# Patient Record
Sex: FEMALE | Race: WHITE | NOT HISPANIC OR LATINO | Employment: OTHER | URBAN - METROPOLITAN AREA
[De-identification: names, ages, dates, MRNs, and addresses within clinical notes are randomized per-mention and may not be internally consistent; named-entity substitution may affect disease eponyms.]

---

## 2023-09-21 ENCOUNTER — HOSPITAL ENCOUNTER (OUTPATIENT)
Dept: DATA CONVERSION | Facility: HOSPITAL | Age: 68
Discharge: HOME | End: 2023-09-21
Payer: MEDICARE

## 2023-09-21 DIAGNOSIS — M17.12 UNILATERAL PRIMARY OSTEOARTHRITIS, LEFT KNEE: ICD-10-CM

## 2023-09-21 LAB
ANION GAP SERPL CALCULATED.3IONS-SCNC: 13 MMOL/L (ref 0–19)
BACTERIA UR QL AUTO: NEGATIVE
BASOPHILS # BLD AUTO: 0.08 K/UL (ref 0–0.22)
BASOPHILS NFR BLD AUTO: 1.1 % (ref 0–1)
BILIRUB UR QL STRIP.AUTO: NEGATIVE
BUN SERPL-MCNC: 20 MG/DL (ref 8–25)
BUN/CREAT SERPL: 20 RATIO (ref 8–21)
CALCIUM SERPL-MCNC: 10.3 MG/DL (ref 8.5–10.4)
CHLORIDE SERPL-SCNC: 102 MMOL/L (ref 97–107)
CLARITY UR: CLEAR
CO2 SERPL-SCNC: 28 MMOL/L (ref 24–31)
COLOR UR: NORMAL
CREAT SERPL-MCNC: 1 MG/DL (ref 0.4–1.6)
DEPRECATED RDW RBC AUTO: 49.1 FL (ref 37–54)
DIFFERENTIAL METHOD BLD: ABNORMAL
EOSINOPHIL # BLD AUTO: 0.14 K/UL (ref 0–0.45)
EOSINOPHIL NFR BLD: 1.9 % (ref 0–3)
ERYTHROCYTE [DISTWIDTH] IN BLOOD BY AUTOMATED COUNT: 14.6 % (ref 11.7–15)
GFR SERPL CREATININE-BSD FRML MDRD: 61 ML/MIN/1.73 M2
GLUCOSE SERPL-MCNC: 93 MG/DL (ref 65–99)
GLUCOSE UR STRIP.AUTO-MCNC: NEGATIVE MG/DL
HBA1C MFR BLD: 6 % (ref 4–6)
HCT VFR BLD AUTO: 44.3 % (ref 36–44)
HGB BLD-MCNC: 14.5 GM/DL (ref 12–15)
HGB UR QL STRIP.AUTO: 1 /HPF (ref 0–3)
HGB UR QL: NEGATIVE
HYALINE CASTS UR QL AUTO: NORMAL /LPF
IMM GRANULOCYTES # BLD AUTO: 0.02 K/UL (ref 0–0.1)
KETONES UR QL STRIP.AUTO: NEGATIVE
LEUKOCYTE ESTERASE UR QL STRIP.AUTO: NEGATIVE
LYMPHOCYTES # BLD AUTO: 1.53 K/UL (ref 1.2–3.2)
LYMPHOCYTES NFR BLD MANUAL: 20.7 % (ref 20–40)
MCH RBC QN AUTO: 30 PG (ref 26–34)
MCHC RBC AUTO-ENTMCNC: 32.7 % (ref 31–37)
MCV RBC AUTO: 91.5 FL (ref 80–100)
MICROSCOPIC (UA): NORMAL
MONOCYTES # BLD AUTO: 0.92 K/UL (ref 0–0.8)
MONOCYTES NFR BLD MANUAL: 12.5 % (ref 0–8)
MRSA DNA SPEC QL NAA+PROBE: NEGATIVE
NEUTROPHILS # BLD AUTO: 4.69 K/UL
NEUTROPHILS # BLD AUTO: 4.69 K/UL (ref 1.8–7.7)
NEUTROPHILS.IMMATURE NFR BLD: 0.3 % (ref 0–1)
NEUTS SEG NFR BLD: 63.5 % (ref 50–70)
NITRITE UR QL STRIP.AUTO: NEGATIVE
NRBC BLD-RTO: 0 /100 WBC
PH UR STRIP.AUTO: 7 [PH] (ref 4.6–8)
PLATELET # BLD AUTO: 270 K/UL (ref 150–450)
PMV BLD AUTO: 12 CU (ref 7–12.6)
POTASSIUM SERPL-SCNC: 3.7 MMOL/L (ref 3.4–5.1)
PROT UR STRIP.AUTO-MCNC: NEGATIVE MG/DL
RBC # BLD AUTO: 4.84 M/UL (ref 4–4.9)
SODIUM SERPL-SCNC: 143 MMOL/L (ref 133–145)
SP GR UR STRIP.AUTO: 1.03 (ref 1–1.03)
SPECIMEN SOURCE: NORMAL
SQUAMOUS UR QL AUTO: NORMAL /HPF
URINE CULTURE: NORMAL
UROBILINOGEN UR QL STRIP.AUTO: NORMAL MG/DL (ref 0–1)
WBC # BLD AUTO: 7.4 K/UL (ref 4.5–11)
WBC #/AREA URNS AUTO: 2 /HPF (ref 0–3)

## 2023-10-03 PROBLEM — M17.12 ARTHRITIS OF LEFT KNEE: Status: ACTIVE | Noted: 2023-10-03

## 2023-10-04 ENCOUNTER — HOSPITAL ENCOUNTER (OUTPATIENT)
Facility: HOSPITAL | Age: 68
Discharge: HOME HEALTH | End: 2023-10-05
Attending: ORTHOPAEDIC SURGERY | Admitting: ORTHOPAEDIC SURGERY
Payer: MEDICARE

## 2023-10-04 ENCOUNTER — PHARMACY VISIT (OUTPATIENT)
Dept: PHARMACY | Facility: CLINIC | Age: 68
End: 2023-10-04
Payer: COMMERCIAL

## 2023-10-04 ENCOUNTER — ANESTHESIA (OUTPATIENT)
Dept: OPERATING ROOM | Facility: HOSPITAL | Age: 68
End: 2023-10-04
Payer: MEDICARE

## 2023-10-04 ENCOUNTER — ANESTHESIA EVENT (OUTPATIENT)
Dept: OPERATING ROOM | Facility: HOSPITAL | Age: 68
End: 2023-10-04
Payer: MEDICARE

## 2023-10-04 DIAGNOSIS — M17.12 ARTHRITIS OF LEFT KNEE: Primary | ICD-10-CM

## 2023-10-04 PROCEDURE — 3600000017 HC OR TIME - EACH INCREMENTAL 1 MINUTE - PROCEDURE LEVEL SIX: Performed by: ORTHOPAEDIC SURGERY

## 2023-10-04 PROCEDURE — 2780000003 HC OR 278 NO HCPCS: Performed by: ORTHOPAEDIC SURGERY

## 2023-10-04 PROCEDURE — 2580000001 HC RX 258 IV SOLUTIONS: Performed by: ORTHOPAEDIC SURGERY

## 2023-10-04 PROCEDURE — 2720000007 HC OR 272 NO HCPCS: Performed by: ORTHOPAEDIC SURGERY

## 2023-10-04 PROCEDURE — C1776 JOINT DEVICE (IMPLANTABLE): HCPCS | Performed by: ORTHOPAEDIC SURGERY

## 2023-10-04 PROCEDURE — 3700000002 HC GENERAL ANESTHESIA TIME - EACH INCREMENTAL 1 MINUTE: Performed by: ORTHOPAEDIC SURGERY

## 2023-10-04 PROCEDURE — 2500000004 HC RX 250 GENERAL PHARMACY W/ HCPCS (ALT 636 FOR OP/ED): Performed by: ANESTHESIOLOGY

## 2023-10-04 PROCEDURE — 7100000002 HC RECOVERY ROOM TIME - EACH INCREMENTAL 1 MINUTE: Performed by: ORTHOPAEDIC SURGERY

## 2023-10-04 PROCEDURE — 7100000011 HC EXTENDED STAY RECOVERY HOURLY - NURSING UNIT

## 2023-10-04 PROCEDURE — 7100000001 HC RECOVERY ROOM TIME - INITIAL BASE CHARGE: Performed by: ORTHOPAEDIC SURGERY

## 2023-10-04 PROCEDURE — 2500000004 HC RX 250 GENERAL PHARMACY W/ HCPCS (ALT 636 FOR OP/ED): Performed by: ORTHOPAEDIC SURGERY

## 2023-10-04 PROCEDURE — A27447 PR TOTAL KNEE ARTHROPLASTY: Performed by: ANESTHESIOLOGY

## 2023-10-04 PROCEDURE — 2500000005 HC RX 250 GENERAL PHARMACY W/O HCPCS: Performed by: ORTHOPAEDIC SURGERY

## 2023-10-04 PROCEDURE — 94762 N-INVAS EAR/PLS OXIMTRY CONT: CPT

## 2023-10-04 PROCEDURE — 3600000018 HC OR TIME - INITIAL BASE CHARGE - PROCEDURE LEVEL SIX: Performed by: ORTHOPAEDIC SURGERY

## 2023-10-04 PROCEDURE — 2500000005 HC RX 250 GENERAL PHARMACY W/O HCPCS: Performed by: ANESTHESIOLOGIST ASSISTANT

## 2023-10-04 PROCEDURE — 3700000001 HC GENERAL ANESTHESIA TIME - INITIAL BASE CHARGE: Performed by: ORTHOPAEDIC SURGERY

## 2023-10-04 PROCEDURE — A27447 PR TOTAL KNEE ARTHROPLASTY: Performed by: ANESTHESIOLOGIST ASSISTANT

## 2023-10-04 PROCEDURE — 97161 PT EVAL LOW COMPLEX 20 MIN: CPT | Mod: GP

## 2023-10-04 PROCEDURE — RXMED WILLOW AMBULATORY MEDICATION CHARGE

## 2023-10-04 PROCEDURE — 97165 OT EVAL LOW COMPLEX 30 MIN: CPT | Mod: GO

## 2023-10-04 PROCEDURE — 2500000001 HC RX 250 WO HCPCS SELF ADMINISTERED DRUGS (ALT 637 FOR MEDICARE OP): Performed by: ORTHOPAEDIC SURGERY

## 2023-10-04 PROCEDURE — 2500000004 HC RX 250 GENERAL PHARMACY W/ HCPCS (ALT 636 FOR OP/ED): Performed by: ANESTHESIOLOGIST ASSISTANT

## 2023-10-04 DEVICE — ATTUNE KNEE SYSTEM TIBIAL BASE ROTATING PLATFORM SIZE 4 CEMENTED
Type: IMPLANTABLE DEVICE | Site: KNEE | Status: FUNCTIONAL
Brand: ATTUNE

## 2023-10-04 DEVICE — ATTUNE KNEE SYSTEM FEMORAL POSTERIOR STABILIZED SIZE 4 LEFT CEMENTED
Type: IMPLANTABLE DEVICE | Site: KNEE | Status: FUNCTIONAL
Brand: ATTUNE

## 2023-10-04 DEVICE — ATTUNE PATELLA MEDIALIZED DOME 38MM CEMENTED AOX
Type: IMPLANTABLE DEVICE | Site: KNEE | Status: FUNCTIONAL
Brand: ATTUNE

## 2023-10-04 DEVICE — SMARTSET HV HIGH VISCOSITY BONE CEMENT 40G
Type: IMPLANTABLE DEVICE | Site: KNEE | Status: FUNCTIONAL
Brand: SMARTSET

## 2023-10-04 RX ORDER — DIAZEPAM 5 MG/1
5 TABLET ORAL EVERY 6 HOURS PRN
Status: DISCONTINUED | OUTPATIENT
Start: 2023-10-04 | End: 2023-10-05 | Stop reason: HOSPADM

## 2023-10-04 RX ORDER — IBUPROFEN 800 MG/1
800 TABLET ORAL EVERY 6 HOURS PRN
COMMUNITY
End: 2023-10-05 | Stop reason: HOSPADM

## 2023-10-04 RX ORDER — CEPHALEXIN 500 MG/1
500 CAPSULE ORAL 3 TIMES DAILY
Qty: 21 CAPSULE | Refills: 0 | Status: SHIPPED | OUTPATIENT
Start: 2023-10-04 | End: 2023-10-12

## 2023-10-04 RX ORDER — DEXAMETHASONE SODIUM PHOSPHATE 100 MG/10ML
INJECTION INTRAMUSCULAR; INTRAVENOUS AS NEEDED
Status: DISCONTINUED | OUTPATIENT
Start: 2023-10-04 | End: 2023-10-04

## 2023-10-04 RX ORDER — ACETAMINOPHEN 325 MG/1
650 TABLET ORAL EVERY 6 HOURS SCHEDULED
Status: DISCONTINUED | OUTPATIENT
Start: 2023-10-04 | End: 2023-10-05 | Stop reason: HOSPADM

## 2023-10-04 RX ORDER — NALOXONE HYDROCHLORIDE 0.4 MG/ML
0.2 INJECTION, SOLUTION INTRAMUSCULAR; INTRAVENOUS; SUBCUTANEOUS EVERY 5 MIN PRN
Status: DISCONTINUED | OUTPATIENT
Start: 2023-10-04 | End: 2023-10-04

## 2023-10-04 RX ORDER — CEFAZOLIN SODIUM 2 G/100ML
INJECTION, SOLUTION INTRAVENOUS AS NEEDED
Status: DISCONTINUED | OUTPATIENT
Start: 2023-10-04 | End: 2023-10-04

## 2023-10-04 RX ORDER — ONDANSETRON HYDROCHLORIDE 2 MG/ML
4 INJECTION, SOLUTION INTRAVENOUS EVERY 4 HOURS PRN
Status: DISCONTINUED | OUTPATIENT
Start: 2023-10-04 | End: 2023-10-05 | Stop reason: HOSPADM

## 2023-10-04 RX ORDER — DIPHENHYDRAMINE HCL 25 MG
12.5 TABLET ORAL EVERY 6 HOURS PRN
Status: DISCONTINUED | OUTPATIENT
Start: 2023-10-04 | End: 2023-10-05 | Stop reason: HOSPADM

## 2023-10-04 RX ORDER — MORPHINE SULFATE 2 MG/ML
INJECTION, SOLUTION INTRAMUSCULAR; INTRAVENOUS AS NEEDED
Status: DISCONTINUED | OUTPATIENT
Start: 2023-10-04 | End: 2023-10-04 | Stop reason: HOSPADM

## 2023-10-04 RX ORDER — ASPIRIN 81 MG/1
81 TABLET ORAL ONCE
COMMUNITY
Start: 2022-09-28 | End: 2023-10-05 | Stop reason: HOSPADM

## 2023-10-04 RX ORDER — SODIUM CHLORIDE 9 MG/ML
100 INJECTION, SOLUTION INTRAVENOUS CONTINUOUS
Status: DISCONTINUED | OUTPATIENT
Start: 2023-10-04 | End: 2023-10-04

## 2023-10-04 RX ORDER — ONDANSETRON HYDROCHLORIDE 2 MG/ML
INJECTION, SOLUTION INTRAVENOUS AS NEEDED
Status: DISCONTINUED | OUTPATIENT
Start: 2023-10-04 | End: 2023-10-04

## 2023-10-04 RX ORDER — ASPIRIN 81 MG/1
81 TABLET ORAL 2 TIMES DAILY
Status: SHIPPED | OUTPATIENT
Start: 2023-10-04

## 2023-10-04 RX ORDER — BUPIVACAINE HCL/EPINEPHRINE 0.5-1:200K
VIAL (ML) INJECTION AS NEEDED
Status: DISCONTINUED | OUTPATIENT
Start: 2023-10-04 | End: 2023-10-04 | Stop reason: HOSPADM

## 2023-10-04 RX ORDER — LABETALOL HYDROCHLORIDE 5 MG/ML
INJECTION, SOLUTION INTRAVENOUS AS NEEDED
Status: DISCONTINUED | OUTPATIENT
Start: 2023-10-04 | End: 2023-10-04

## 2023-10-04 RX ORDER — CEFAZOLIN SODIUM 2 G/100ML
2 INJECTION, SOLUTION INTRAVENOUS EVERY 8 HOURS
Status: DISCONTINUED | OUTPATIENT
Start: 2023-10-04 | End: 2023-10-05 | Stop reason: HOSPADM

## 2023-10-04 RX ORDER — OMEPRAZOLE 40 MG/1
40 CAPSULE, DELAYED RELEASE ORAL
COMMUNITY
Start: 2022-09-16

## 2023-10-04 RX ORDER — ASPIRIN 81 MG/1
81 TABLET ORAL 2 TIMES DAILY
Status: DISCONTINUED | OUTPATIENT
Start: 2023-10-05 | End: 2023-10-05 | Stop reason: HOSPADM

## 2023-10-04 RX ORDER — FLUTICASONE PROPIONATE 50 MCG
2 SPRAY, SUSPENSION (ML) NASAL DAILY
COMMUNITY
Start: 2022-09-04

## 2023-10-04 RX ORDER — LIDOCAINE HYDROCHLORIDE 10 MG/ML
0.1 INJECTION, SOLUTION EPIDURAL; INFILTRATION; INTRACAUDAL; PERINEURAL ONCE
Status: DISCONTINUED | OUTPATIENT
Start: 2023-10-04 | End: 2023-10-04 | Stop reason: HOSPADM

## 2023-10-04 RX ORDER — SODIUM CHLORIDE, SODIUM LACTATE, POTASSIUM CHLORIDE, CALCIUM CHLORIDE 600; 310; 30; 20 MG/100ML; MG/100ML; MG/100ML; MG/100ML
50 INJECTION, SOLUTION INTRAVENOUS CONTINUOUS
Status: DISCONTINUED | OUTPATIENT
Start: 2023-10-04 | End: 2023-10-04

## 2023-10-04 RX ORDER — KETOROLAC TROMETHAMINE 30 MG/ML
15 INJECTION, SOLUTION INTRAMUSCULAR; INTRAVENOUS EVERY 6 HOURS
Status: DISCONTINUED | OUTPATIENT
Start: 2023-10-04 | End: 2023-10-05 | Stop reason: HOSPADM

## 2023-10-04 RX ORDER — FENTANYL CITRATE 50 UG/ML
50 INJECTION, SOLUTION INTRAMUSCULAR; INTRAVENOUS EVERY 5 MIN PRN
Status: DISCONTINUED | OUTPATIENT
Start: 2023-10-04 | End: 2023-10-04 | Stop reason: HOSPADM

## 2023-10-04 RX ORDER — CEFAZOLIN SODIUM 2 G/100ML
2 INJECTION, SOLUTION INTRAVENOUS ONCE
Status: DISCONTINUED | OUTPATIENT
Start: 2023-10-04 | End: 2023-10-04

## 2023-10-04 RX ORDER — MELOXICAM 15 MG/1
15 TABLET ORAL
COMMUNITY
End: 2023-10-05 | Stop reason: HOSPADM

## 2023-10-04 RX ORDER — MIDAZOLAM HYDROCHLORIDE 1 MG/ML
2 INJECTION INTRAMUSCULAR; INTRAVENOUS ONCE
Status: COMPLETED | OUTPATIENT
Start: 2023-10-04 | End: 2023-10-04

## 2023-10-04 RX ORDER — TRIAMTERENE/HYDROCHLOROTHIAZID 37.5-25 MG
1 TABLET ORAL 2 TIMES DAILY
COMMUNITY
Start: 2021-08-31

## 2023-10-04 RX ORDER — CEFAZOLIN SODIUM 2 G/100ML
2 INJECTION, SOLUTION INTRAVENOUS EVERY 8 HOURS
Status: DISCONTINUED | OUTPATIENT
Start: 2023-10-04 | End: 2023-10-04

## 2023-10-04 RX ORDER — ACETAMINOPHEN 500 MG
500 TABLET ORAL EVERY 6 HOURS PRN
COMMUNITY
Start: 2022-10-06 | End: 2023-10-05 | Stop reason: HOSPADM

## 2023-10-04 RX ORDER — NALOXONE HYDROCHLORIDE 0.4 MG/ML
0.2 INJECTION, SOLUTION INTRAMUSCULAR; INTRAVENOUS; SUBCUTANEOUS EVERY 5 MIN PRN
Status: DISCONTINUED | OUTPATIENT
Start: 2023-10-04 | End: 2023-10-05 | Stop reason: HOSPADM

## 2023-10-04 RX ORDER — ACETAMINOPHEN 10 MG/ML
1000 INJECTION, SOLUTION INTRAVENOUS ONCE
Status: COMPLETED | OUTPATIENT
Start: 2023-10-04 | End: 2023-10-04

## 2023-10-04 RX ORDER — PROPOFOL 10 MG/ML
INJECTION, EMULSION INTRAVENOUS AS NEEDED
Status: DISCONTINUED | OUTPATIENT
Start: 2023-10-04 | End: 2023-10-04

## 2023-10-04 RX ORDER — FERROUS SULFATE 325(65) MG
65 TABLET ORAL
Status: DISCONTINUED | OUTPATIENT
Start: 2023-10-04 | End: 2023-10-05 | Stop reason: HOSPADM

## 2023-10-04 RX ORDER — MONTELUKAST SODIUM 10 MG/1
1 TABLET ORAL NIGHTLY
COMMUNITY
Start: 2021-08-25

## 2023-10-04 RX ORDER — FENTANYL CITRATE 50 UG/ML
INJECTION, SOLUTION INTRAMUSCULAR; INTRAVENOUS AS NEEDED
Status: DISCONTINUED | OUTPATIENT
Start: 2023-10-04 | End: 2023-10-04

## 2023-10-04 RX ORDER — FLUTICASONE PROPIONATE 50 MCG
2 SPRAY, SUSPENSION (ML) NASAL EVERY 12 HOURS PRN
COMMUNITY
Start: 2023-09-21

## 2023-10-04 RX ORDER — BENZONATATE 100 MG/1
200 CAPSULE ORAL EVERY 8 HOURS PRN
COMMUNITY
Start: 2023-04-05

## 2023-10-04 RX ORDER — TRANEXAMIC ACID 100 MG/ML
INJECTION, SOLUTION INTRAVENOUS AS NEEDED
Status: DISCONTINUED | OUTPATIENT
Start: 2023-10-04 | End: 2023-10-04

## 2023-10-04 RX ORDER — LIDOCAINE HYDROCHLORIDE 10 MG/ML
INJECTION INFILTRATION; PERINEURAL AS NEEDED
Status: DISCONTINUED | OUTPATIENT
Start: 2023-10-04 | End: 2023-10-04

## 2023-10-04 RX ORDER — HYDROCODONE BITARTRATE AND ACETAMINOPHEN 5; 325 MG/1; MG/1
1 TABLET ORAL
Qty: 36 TABLET | Refills: 0 | Status: SHIPPED | OUTPATIENT
Start: 2023-10-04 | End: 2023-10-11

## 2023-10-04 RX ORDER — DOCUSATE SODIUM 100 MG/1
100 CAPSULE, LIQUID FILLED ORAL 2 TIMES DAILY
Status: DISCONTINUED | OUTPATIENT
Start: 2023-10-04 | End: 2023-10-05 | Stop reason: HOSPADM

## 2023-10-04 RX ORDER — KETOROLAC TROMETHAMINE 30 MG/ML
15 INJECTION, SOLUTION INTRAMUSCULAR; INTRAVENOUS EVERY 6 HOURS PRN
Status: DISCONTINUED | OUTPATIENT
Start: 2023-10-05 | End: 2023-10-05 | Stop reason: HOSPADM

## 2023-10-04 RX ORDER — ALBUTEROL SULFATE 90 UG/1
2 AEROSOL, METERED RESPIRATORY (INHALATION) EVERY 4 HOURS PRN
COMMUNITY
Start: 2023-04-05

## 2023-10-04 RX ORDER — VIT C/E/ZN/COPPR/LUTEIN/ZEAXAN 250MG-90MG
1000 CAPSULE ORAL
COMMUNITY
Start: 2012-01-09

## 2023-10-04 RX ORDER — SODIUM CHLORIDE AND POTASSIUM CHLORIDE 150; 900 MG/100ML; MG/100ML
100 INJECTION, SOLUTION INTRAVENOUS CONTINUOUS
Status: DISCONTINUED | OUTPATIENT
Start: 2023-10-04 | End: 2023-10-05 | Stop reason: HOSPADM

## 2023-10-04 RX ORDER — SODIUM CHLORIDE, SODIUM LACTATE, POTASSIUM CHLORIDE, CALCIUM CHLORIDE 600; 310; 30; 20 MG/100ML; MG/100ML; MG/100ML; MG/100ML
100 INJECTION, SOLUTION INTRAVENOUS CONTINUOUS
Status: DISCONTINUED | OUTPATIENT
Start: 2023-10-04 | End: 2023-10-04 | Stop reason: HOSPADM

## 2023-10-04 RX ORDER — HYDROCODONE BITARTRATE AND ACETAMINOPHEN 5; 325 MG/1; MG/1
2 TABLET ORAL EVERY 6 HOURS PRN
Status: DISCONTINUED | OUTPATIENT
Start: 2023-10-04 | End: 2023-10-05 | Stop reason: HOSPADM

## 2023-10-04 RX ORDER — CEFAZOLIN SODIUM 1 G/50ML
1 SOLUTION INTRAVENOUS ONCE
Status: DISCONTINUED | OUTPATIENT
Start: 2023-10-04 | End: 2023-10-04

## 2023-10-04 RX ORDER — HYDROCODONE BITARTRATE AND ACETAMINOPHEN 5; 325 MG/1; MG/1
1 TABLET ORAL EVERY 6 HOURS PRN
Status: DISCONTINUED | OUTPATIENT
Start: 2023-10-04 | End: 2023-10-05 | Stop reason: HOSPADM

## 2023-10-04 RX ORDER — VANCOMYCIN HYDROCHLORIDE 1 G/20ML
INJECTION, POWDER, LYOPHILIZED, FOR SOLUTION INTRAVENOUS AS NEEDED
Status: DISCONTINUED | OUTPATIENT
Start: 2023-10-04 | End: 2023-10-04 | Stop reason: HOSPADM

## 2023-10-04 RX ADMIN — MIDAZOLAM HYDROCHLORIDE 2 MG: 1 INJECTION, SOLUTION INTRAMUSCULAR; INTRAVENOUS at 10:15

## 2023-10-04 RX ADMIN — SODIUM CHLORIDE, POTASSIUM CHLORIDE, SODIUM LACTATE AND CALCIUM CHLORIDE: 600; 310; 30; 20 INJECTION, SOLUTION INTRAVENOUS at 11:23

## 2023-10-04 RX ADMIN — HYDROMORPHONE HYDROCHLORIDE 0.5 MG: 1 INJECTION, SOLUTION INTRAMUSCULAR; INTRAVENOUS; SUBCUTANEOUS at 12:41

## 2023-10-04 RX ADMIN — HYDROMORPHONE HYDROCHLORIDE 0.5 MG: 1 INJECTION, SOLUTION INTRAMUSCULAR; INTRAVENOUS; SUBCUTANEOUS at 13:19

## 2023-10-04 RX ADMIN — CEFAZOLIN SODIUM 2 G: 2 INJECTION, SOLUTION INTRAVENOUS at 10:48

## 2023-10-04 RX ADMIN — FENTANYL CITRATE 50 MCG: 0.05 INJECTION, SOLUTION INTRAMUSCULAR; INTRAVENOUS at 10:59

## 2023-10-04 RX ADMIN — FENTANYL CITRATE 50 MCG: 0.05 INJECTION, SOLUTION INTRAMUSCULAR; INTRAVENOUS at 11:10

## 2023-10-04 RX ADMIN — ONDANSETRON HYDROCHLORIDE 4 MG: 2 INJECTION INTRAMUSCULAR; INTRAVENOUS at 10:52

## 2023-10-04 RX ADMIN — CEFAZOLIN SODIUM 2 G: 2 INJECTION, SOLUTION INTRAVENOUS at 23:01

## 2023-10-04 RX ADMIN — FENTANYL CITRATE 50 MCG: 0.05 INJECTION, SOLUTION INTRAMUSCULAR; INTRAVENOUS at 10:52

## 2023-10-04 RX ADMIN — ACETAMINOPHEN 650 MG: 325 TABLET, FILM COATED ORAL at 21:07

## 2023-10-04 RX ADMIN — HYDROMORPHONE HYDROCHLORIDE 0.5 MG: 1 INJECTION, SOLUTION INTRAMUSCULAR; INTRAVENOUS; SUBCUTANEOUS at 12:30

## 2023-10-04 RX ADMIN — SODIUM CHLORIDE AND POTASSIUM CHLORIDE 100 ML/HR: 9; 1.49 INJECTION, SOLUTION INTRAVENOUS at 15:33

## 2023-10-04 RX ADMIN — FENTANYL CITRATE 50 MCG: 0.05 INJECTION, SOLUTION INTRAMUSCULAR; INTRAVENOUS at 10:46

## 2023-10-04 RX ADMIN — ONDANSETRON 4 MG: 2 INJECTION INTRAMUSCULAR; INTRAVENOUS at 18:18

## 2023-10-04 RX ADMIN — FERROUS SULFATE TAB 325 MG (65 MG ELEMENTAL FE) 65 MG OF IRON: 325 (65 FE) TAB at 17:11

## 2023-10-04 RX ADMIN — TRANEXAMIC ACID 2 G: 100 INJECTION, SOLUTION INTRAVENOUS at 10:52

## 2023-10-04 RX ADMIN — LABETALOL HYDROCHLORIDE 5 MG: 5 INJECTION, SOLUTION INTRAVENOUS at 11:01

## 2023-10-04 RX ADMIN — ACETAMINOPHEN 650 MG: 325 TABLET, FILM COATED ORAL at 17:11

## 2023-10-04 RX ADMIN — DOCUSATE SODIUM 100 MG: 100 CAPSULE, LIQUID FILLED ORAL at 21:08

## 2023-10-04 RX ADMIN — DEXAMETHASONE SODIUM PHOSPHATE 8 MG: 10 INJECTION INTRAMUSCULAR; INTRAVENOUS at 10:52

## 2023-10-04 RX ADMIN — FENTANYL CITRATE 50 MCG: 50 INJECTION, SOLUTION INTRAMUSCULAR; INTRAVENOUS at 10:15

## 2023-10-04 RX ADMIN — LIDOCAINE HYDROCHLORIDE 50 MG: 10 INJECTION, SOLUTION INFILTRATION; PERINEURAL at 10:39

## 2023-10-04 RX ADMIN — SODIUM CHLORIDE, POTASSIUM CHLORIDE, SODIUM LACTATE AND CALCIUM CHLORIDE: 600; 310; 30; 20 INJECTION, SOLUTION INTRAVENOUS at 10:32

## 2023-10-04 RX ADMIN — ACETAMINOPHEN 1000 MG: 10 INJECTION INTRAVENOUS at 13:05

## 2023-10-04 RX ADMIN — FENTANYL CITRATE 25 MCG: 0.05 INJECTION, SOLUTION INTRAMUSCULAR; INTRAVENOUS at 12:03

## 2023-10-04 RX ADMIN — FENTANYL CITRATE 50 MCG: 0.05 INJECTION, SOLUTION INTRAMUSCULAR; INTRAVENOUS at 12:18

## 2023-10-04 RX ADMIN — PROPOFOL 200 MG: 10 INJECTION, EMULSION INTRAVENOUS at 10:39

## 2023-10-04 RX ADMIN — FENTANYL CITRATE 25 MCG: 0.05 INJECTION, SOLUTION INTRAMUSCULAR; INTRAVENOUS at 11:58

## 2023-10-04 RX ADMIN — HYDROMORPHONE HYDROCHLORIDE 0.5 MG: 1 INJECTION, SOLUTION INTRAMUSCULAR; INTRAVENOUS; SUBCUTANEOUS at 12:55

## 2023-10-04 RX ADMIN — Medication 2 L/MIN: at 14:15

## 2023-10-04 RX ADMIN — CEFAZOLIN SODIUM 2 G: 2 INJECTION, SOLUTION INTRAVENOUS at 15:31

## 2023-10-04 RX ADMIN — KETOROLAC TROMETHAMINE 15 MG: 30 INJECTION, SOLUTION INTRAMUSCULAR at 17:11

## 2023-10-04 RX ADMIN — KETOROLAC TROMETHAMINE 15 MG: 30 INJECTION, SOLUTION INTRAMUSCULAR at 23:00

## 2023-10-04 SDOH — ECONOMIC STABILITY: HOUSING INSECURITY
IN THE LAST 12 MONTHS, WAS THERE A TIME WHEN YOU DID NOT HAVE A STEADY PLACE TO SLEEP OR SLEPT IN A SHELTER (INCLUDING NOW)?: NO

## 2023-10-04 SDOH — ECONOMIC STABILITY: INCOME INSECURITY: IN THE PAST 12 MONTHS, HAS THE ELECTRIC, GAS, OIL, OR WATER COMPANY THREATENED TO SHUT OFF SERVICE IN YOUR HOME?: NO

## 2023-10-04 SDOH — ECONOMIC STABILITY: INCOME INSECURITY: HOW HARD IS IT FOR YOU TO PAY FOR THE VERY BASICS LIKE FOOD, HOUSING, MEDICAL CARE, AND HEATING?: NOT HARD AT ALL

## 2023-10-04 SDOH — ECONOMIC STABILITY: FOOD INSECURITY: WITHIN THE PAST 12 MONTHS, THE FOOD YOU BOUGHT JUST DIDN'T LAST AND YOU DIDN'T HAVE MONEY TO GET MORE.: NEVER TRUE

## 2023-10-04 SDOH — ECONOMIC STABILITY: FOOD INSECURITY: WITHIN THE PAST 12 MONTHS, YOU WORRIED THAT YOUR FOOD WOULD RUN OUT BEFORE YOU GOT MONEY TO BUY MORE.: NEVER TRUE

## 2023-10-04 SDOH — HEALTH STABILITY: PHYSICAL HEALTH: ON AVERAGE, HOW MANY MINUTES DO YOU ENGAGE IN EXERCISE AT THIS LEVEL?: 0 MIN

## 2023-10-04 SDOH — SOCIAL STABILITY: SOCIAL INSECURITY: HAS ANYONE EVER THREATENED TO HURT YOUR FAMILY OR YOUR PETS?: NO

## 2023-10-04 SDOH — SOCIAL STABILITY: SOCIAL INSECURITY
WITHIN THE LAST YEAR, HAVE TO BEEN RAPED OR FORCED TO HAVE ANY KIND OF SEXUAL ACTIVITY BY YOUR PARTNER OR EX-PARTNER?: NO

## 2023-10-04 SDOH — SOCIAL STABILITY: SOCIAL INSECURITY: ARE YOU OR HAVE YOU BEEN THREATENED OR ABUSED PHYSICALLY, EMOTIONALLY, OR SEXUALLY BY ANYONE?: NO

## 2023-10-04 SDOH — SOCIAL STABILITY: SOCIAL NETWORK: HOW OFTEN DO YOU ATTENT MEETINGS OF THE CLUB OR ORGANIZATION YOU BELONG TO?: MORE THAN 4 TIMES PER YEAR

## 2023-10-04 SDOH — HEALTH STABILITY: MENTAL HEALTH: HOW OFTEN DO YOU HAVE 6 OR MORE DRINKS ON ONE OCCASION?: NEVER

## 2023-10-04 SDOH — ECONOMIC STABILITY: INCOME INSECURITY: IN THE LAST 12 MONTHS, WAS THERE A TIME WHEN YOU WERE NOT ABLE TO PAY THE MORTGAGE OR RENT ON TIME?: NO

## 2023-10-04 SDOH — SOCIAL STABILITY: SOCIAL NETWORK
IN A TYPICAL WEEK, HOW MANY TIMES DO YOU TALK ON THE PHONE WITH FAMILY, FRIENDS, OR NEIGHBORS?: MORE THAN THREE TIMES A WEEK

## 2023-10-04 SDOH — ECONOMIC STABILITY: HOUSING INSECURITY: IN THE LAST 12 MONTHS, HOW MANY PLACES HAVE YOU LIVED?: 1

## 2023-10-04 SDOH — HEALTH STABILITY: MENTAL HEALTH: HOW OFTEN DO YOU HAVE A DRINK CONTAINING ALCOHOL?: NEVER

## 2023-10-04 SDOH — HEALTH STABILITY: MENTAL HEALTH
STRESS IS WHEN SOMEONE FEELS TENSE, NERVOUS, ANXIOUS, OR CAN'T SLEEP AT NIGHT BECAUSE THEIR MIND IS TROUBLED. HOW STRESSED ARE YOU?: NOT AT ALL

## 2023-10-04 SDOH — SOCIAL STABILITY: SOCIAL INSECURITY: ABUSE: ADULT

## 2023-10-04 SDOH — SOCIAL STABILITY: SOCIAL INSECURITY: DO YOU FEEL ANYONE HAS EXPLOITED OR TAKEN ADVANTAGE OF YOU FINANCIALLY OR OF YOUR PERSONAL PROPERTY?: NO

## 2023-10-04 SDOH — SOCIAL STABILITY: SOCIAL NETWORK: ARE YOU MARRIED, WIDOWED, DIVORCED, SEPARATED, NEVER MARRIED, OR LIVING WITH A PARTNER?: MARRIED

## 2023-10-04 SDOH — SOCIAL STABILITY: SOCIAL NETWORK
DO YOU BELONG TO ANY CLUBS OR ORGANIZATIONS SUCH AS CHURCH GROUPS UNIONS, FRATERNAL OR ATHLETIC GROUPS, OR SCHOOL GROUPS?: NO

## 2023-10-04 SDOH — SOCIAL STABILITY: SOCIAL INSECURITY: ARE THERE ANY APPARENT SIGNS OF INJURIES/BEHAVIORS THAT COULD BE RELATED TO ABUSE/NEGLECT?: NO

## 2023-10-04 SDOH — SOCIAL STABILITY: SOCIAL NETWORK: HOW OFTEN DO YOU ATTEND CHURCH OR RELIGIOUS SERVICES?: MORE THAN 4 TIMES PER YEAR

## 2023-10-04 SDOH — SOCIAL STABILITY: SOCIAL INSECURITY: WITHIN THE LAST YEAR, HAVE YOU BEEN AFRAID OF YOUR PARTNER OR EX-PARTNER?: NO

## 2023-10-04 SDOH — SOCIAL STABILITY: SOCIAL INSECURITY: DOES ANYONE TRY TO KEEP YOU FROM HAVING/CONTACTING OTHER FRIENDS OR DOING THINGS OUTSIDE YOUR HOME?: NO

## 2023-10-04 SDOH — ECONOMIC STABILITY: TRANSPORTATION INSECURITY
IN THE PAST 12 MONTHS, HAS LACK OF TRANSPORTATION KEPT YOU FROM MEETINGS, WORK, OR FROM GETTING THINGS NEEDED FOR DAILY LIVING?: NO

## 2023-10-04 SDOH — SOCIAL STABILITY: SOCIAL INSECURITY: HAVE YOU HAD THOUGHTS OF HARMING ANYONE ELSE?: NO

## 2023-10-04 SDOH — SOCIAL STABILITY: SOCIAL INSECURITY: HAVE YOU HAD THOUGHTS OF HARMING ANYONE ELSE?: YES

## 2023-10-04 SDOH — HEALTH STABILITY: MENTAL HEALTH: HOW MANY STANDARD DRINKS CONTAINING ALCOHOL DO YOU HAVE ON A TYPICAL DAY?: PATIENT DOES NOT DRINK

## 2023-10-04 SDOH — HEALTH STABILITY: PHYSICAL HEALTH: ON AVERAGE, HOW MANY DAYS PER WEEK DO YOU ENGAGE IN MODERATE TO STRENUOUS EXERCISE (LIKE A BRISK WALK)?: 0 DAYS

## 2023-10-04 SDOH — SOCIAL STABILITY: SOCIAL INSECURITY: DO YOU FEEL UNSAFE GOING BACK TO THE PLACE WHERE YOU ARE LIVING?: NO

## 2023-10-04 SDOH — SOCIAL STABILITY: SOCIAL NETWORK: HOW OFTEN DO YOU GET TOGETHER WITH FRIENDS OR RELATIVES?: MORE THAN THREE TIMES A WEEK

## 2023-10-04 SDOH — ECONOMIC STABILITY: TRANSPORTATION INSECURITY
IN THE PAST 12 MONTHS, HAS THE LACK OF TRANSPORTATION KEPT YOU FROM MEDICAL APPOINTMENTS OR FROM GETTING MEDICATIONS?: NO

## 2023-10-04 ASSESSMENT — PAIN SCALES - GENERAL
PAINLEVEL_OUTOF10: 3
PAINLEVEL_OUTOF10: 7
PAINLEVEL_OUTOF10: 7
PAINLEVEL_OUTOF10: 6
PAINLEVEL_OUTOF10: 5 - MODERATE PAIN
PAINLEVEL_OUTOF10: 8
PAINLEVEL_OUTOF10: 5 - MODERATE PAIN
PAINLEVEL_OUTOF10: 8
PAINLEVEL_OUTOF10: 9
PAINLEVEL_OUTOF10: 8
PAINLEVEL_OUTOF10: 5 - MODERATE PAIN
PAINLEVEL_OUTOF10: 7
PAINLEVEL_OUTOF10: 4
PAINLEVEL_OUTOF10: 8
PAINLEVEL_OUTOF10: 8
PAINLEVEL_OUTOF10: 7
PAINLEVEL_OUTOF10: 4
PAINLEVEL_OUTOF10: 9

## 2023-10-04 ASSESSMENT — COGNITIVE AND FUNCTIONAL STATUS - GENERAL
TURNING FROM BACK TO SIDE WHILE IN FLAT BAD: A LITTLE
MOBILITY SCORE: 21
WALKING IN HOSPITAL ROOM: A LITTLE
STANDING UP FROM CHAIR USING ARMS: A LITTLE
WALKING IN HOSPITAL ROOM: A LOT
DRESSING REGULAR LOWER BODY CLOTHING: A LOT
MOVING TO AND FROM BED TO CHAIR: A LITTLE
DRESSING REGULAR LOWER BODY CLOTHING: A LITTLE
MOBILITY SCORE: 15
DAILY ACTIVITIY SCORE: 22
DAILY ACTIVITIY SCORE: 16
DAILY ACTIVITIY SCORE: 16
TOILETING: A LITTLE
MOBILITY SCORE: 15
STANDING UP FROM CHAIR USING ARMS: A LITTLE
CLIMB 3 TO 5 STEPS WITH RAILING: TOTAL
CLIMB 3 TO 5 STEPS WITH RAILING: A LOT
TOILETING: A LOT
DRESSING REGULAR LOWER BODY CLOTHING: A LOT
PATIENT BASELINE BEDBOUND: NO
HELP NEEDED FOR BATHING: A LOT
CLIMB 3 TO 5 STEPS WITH RAILING: TOTAL
DRESSING REGULAR UPPER BODY CLOTHING: A LOT
MOVING FROM LYING ON BACK TO SITTING ON SIDE OF FLAT BED WITH BEDRAILS: A LITTLE
MOVING FROM LYING ON BACK TO SITTING ON SIDE OF FLAT BED WITH BEDRAILS: A LITTLE
WALKING IN HOSPITAL ROOM: A LOT
DRESSING REGULAR UPPER BODY CLOTHING: A LITTLE
HELP NEEDED FOR BATHING: A LOT
TURNING FROM BACK TO SIDE WHILE IN FLAT BAD: A LITTLE
MOVING TO AND FROM BED TO CHAIR: A LITTLE
TOILETING: TOTAL

## 2023-10-04 ASSESSMENT — LIFESTYLE VARIABLES
AUDIT-C TOTAL SCORE: 0
PRESCIPTION_ABUSE_PAST_12_MONTHS: NO
HOW MANY STANDARD DRINKS CONTAINING ALCOHOL DO YOU HAVE ON A TYPICAL DAY: PATIENT DOES NOT DRINK
PRESCIPTION_ABUSE_PAST_12_MONTHS: NO
SKIP TO QUESTIONS 9-10: 1
AUDIT-C TOTAL SCORE: 0
AUDIT-C TOTAL SCORE: 0
HOW OFTEN DO YOU HAVE A DRINK CONTAINING ALCOHOL: NEVER
HOW MANY STANDARD DRINKS CONTAINING ALCOHOL DO YOU HAVE ON A TYPICAL DAY: PATIENT DOES NOT DRINK
HOW OFTEN DO YOU HAVE 6 OR MORE DRINKS ON ONE OCCASION: NEVER
SUBSTANCE_ABUSE_PAST_12_MONTHS: NO
SKIP TO QUESTIONS 9-10: 1
SKIP TO QUESTIONS 9-10: 1
AUDIT-C TOTAL SCORE: 0
HOW OFTEN DO YOU HAVE 6 OR MORE DRINKS ON ONE OCCASION: NEVER
HOW OFTEN DO YOU HAVE A DRINK CONTAINING ALCOHOL: NEVER
SUBSTANCE_ABUSE_PAST_12_MONTHS: NO
AUDIT-C TOTAL SCORE: 0

## 2023-10-04 ASSESSMENT — COLUMBIA-SUICIDE SEVERITY RATING SCALE - C-SSRS
1. IN THE PAST MONTH, HAVE YOU WISHED YOU WERE DEAD OR WISHED YOU COULD GO TO SLEEP AND NOT WAKE UP?: NO
6. HAVE YOU EVER DONE ANYTHING, STARTED TO DO ANYTHING, OR PREPARED TO DO ANYTHING TO END YOUR LIFE?: NO
2. HAVE YOU ACTUALLY HAD ANY THOUGHTS OF KILLING YOURSELF?: NO

## 2023-10-04 ASSESSMENT — ACTIVITIES OF DAILY LIVING (ADL)
DRESSING YOURSELF: INDEPENDENT
HEARING - LEFT EAR: FUNCTIONAL
WALKS IN HOME: NEEDS ASSISTANCE
ADEQUATE_TO_COMPLETE_ADL: YES
PATIENT'S MEMORY ADEQUATE TO SAFELY COMPLETE DAILY ACTIVITIES?: YES
DRESSING YOURSELF: NEEDS ASSISTANCE
TOILETING: NEEDS ASSISTANCE
FEEDING YOURSELF: INDEPENDENT
ADEQUATE_TO_COMPLETE_ADL: YES
ADL_ASSISTANCE: INDEPENDENT
HEARING - LEFT EAR: FUNCTIONAL
WALKS IN HOME: INDEPENDENT
TOILETING: NEEDS ASSISTANCE
ASSISTIVE_DEVICE: WALKER
PATIENT'S MEMORY ADEQUATE TO SAFELY COMPLETE DAILY ACTIVITIES?: YES
ADL_ASSISTANCE: INDEPENDENT
GROOMING: NEEDS ASSISTANCE
HEARING - RIGHT EAR: FUNCTIONAL
BATHING: NEEDS ASSISTANCE
FEEDING YOURSELF: INDEPENDENT
JUDGMENT_ADEQUATE_SAFELY_COMPLETE_DAILY_ACTIVITIES: YES
GROOMING: NEEDS ASSISTANCE
HEARING - RIGHT EAR: FUNCTIONAL
BATHING_ASSISTANCE: MAXIMAL
BATHING: INDEPENDENT
JUDGMENT_ADEQUATE_SAFELY_COMPLETE_DAILY_ACTIVITIES: YES

## 2023-10-04 ASSESSMENT — PATIENT HEALTH QUESTIONNAIRE - PHQ9
SUM OF ALL RESPONSES TO PHQ9 QUESTIONS 1 & 2: 0
1. LITTLE INTEREST OR PLEASURE IN DOING THINGS: NOT AT ALL
2. FEELING DOWN, DEPRESSED OR HOPELESS: NOT AT ALL
1. LITTLE INTEREST OR PLEASURE IN DOING THINGS: NOT AT ALL
2. FEELING DOWN, DEPRESSED OR HOPELESS: NOT AT ALL
SUM OF ALL RESPONSES TO PHQ9 QUESTIONS 1 & 2: 0

## 2023-10-04 NOTE — ANESTHESIA PREPROCEDURE EVALUATION
Patient: Gabby Turner    Procedure Information       Date/Time: 10/04/23 0930    Procedure: LEFT TOTAL KNEE  ARTHROPLASTY (DEPUY) (Left: Knee)    [facility]: [facility] / [facility]    Surgeons: Kiran Reyes MD            Relevant Problems   Other   (+) Arthritis of left knee       Clinical information reviewed:    Allergies  Meds     OB Status           NPO Detail:  NPO/Void Status  Date of Last Liquid: 10/03/23  Time of Last Liquid: 2000  Date of Last Solid: 10/03/23  Time of Last Solid: 2000         PHYSICAL EXAM    Anesthesia Plan    ASA 3

## 2023-10-04 NOTE — PROGRESS NOTES
Physical Therapy    Physical Therapy Evaluation    Patient Name: Gabby Turner  MRN: [id number]  Today's Date: 10/4/2023   Time Calculation  Start Time: 1451  Stop Time: 1520  Time Calculation (min): 29 min    Assessment/Plan   PT Assessment  PT Assessment Results: Decreased strength, Decreased range of motion, Decreased endurance, Impaired balance, Decreased mobility, Decreased safety awareness  Rehab Prognosis: Good  Medical Staff Made Aware: Yes  End of Session Communication: Bedside nurse  Assessment Comment: Pt is a 69 y/o female who is s/p L TKA. Limited by post-op pain, weakness and impaired mobility.  End of Session Patient Position: Bed, 3 rail up, Alarm on  IP OR SWING BED PT PLAN  Inpatient or Swing Bed: Inpatient  PT Plan  Treatment/Interventions: Bed mobility, Transfer training, Stair training, Gait training, Balance training, Strengthening, Endurance training, Range of motion, Therapeutic exercise, Therapeutic activity  PT Plan: Skilled PT  PT Frequency: BID  PT Discharge Recommendations: Low intensity level of continued care  Equipment Recommended upon Discharge: Wheeled walker  PT Recommended Transfer Status: Assist x1, Assistive device  PT - OK to Discharge: Yes      Subjective   General Visit Information:  General  Reason for Referral: s/p L TKA  Referred By: Dr. Reyes  Past Medical History Relevant to Rehab: knee OA  Family/Caregiver Present: Yes  Caregiver Feedback: spouse initially  Co-Treatment: OT  Co-Treatment Reason: safe mobility  Prior to Session Communication: Bedside nurse  Patient Position Received: Bed, 3 rail up  General Comment: Supine in bed, 3 L O2, PIV.  Home Living:  Home Living  Type of Home: House  Lives With: Spouse  Home Layout: Multi-level  Home Access: Stairs to enter with rails  Entrance Stairs-Number of Steps: 2  Bathroom Shower/Tub: Tub/shower unit, Walk-in shower  Bathroom Equipment: Built-in shower seat  Home Living Comments: split level home wtih 7 stairs to each  "floor  Prior Level of Function:  Prior Function Per Pt/Caregiver Report  Level of [place]: Independent with ADLs and functional transfers  ADL Assistance: Independent  Homemaking Assistance: Independent  Ambulatory Assistance: Independent  Prior Function Comments: Only used walker after R TKA  Precautions:  Precautions  Medical Precautions: Fall precautions  Post-Surgical Precautions: Left total knee precautions  Vital Signs:       Objective   Pain:  Pain Assessment  Pain Assessment: 0-10  Pain Score: 7  Pain Type: Surgical pain  Pain Location:  (L hip/\"Sciatic pain\")  Cognition:  Cognition  Overall Cognitive Status: Within Functional Limits  Attention: Within Functional Limits  Memory: Within Funtional Limits  Problem Solving: Within Functional Limits    General Assessments:      Static Sitting Balance  Static Sitting-Level of Assistance: Close supervision    Static Standing Balance  Static Standing-Level of Assistance: Moderate assistance  Functional Assessments:  Bed Mobility  Bed Mobility: Yes  Bed Mobility 1  Bed Mobility 1: Supine to sitting  Level of Assistance 1: Moderate assistance  Bed Mobility Comments 1: HOB elevated, increased time to complete  Bed Mobility 2  Bed Mobility  2: Sitting to supine  Level of Assistance 2: Moderate assistance (x2 due to poor positioning)  Bed Mobility 3  Bed Mobility 3: Rolling left, Rolling right  Level of Assistance 3: Minimum assistance  Bed Mobility Comments 3: used bedrails    Transfers  Transfer: Yes  Transfer 1  Technique 1: Sit to stand, Stand to sit  Transfer Device 1: Walker  Transfer Level of Assistance 1: Minimum assistance, +2  Trials/Comments 1: x 2 trials from elevated surface. Required assistance wtih scooting buttocks back onto bed due to elevated height compared to short stature.    Ambulation/Gait Training  Ambulation/Gait Training Performed: No  Extremity/Trunk Assessments:  RLE   RLE : Within Functional Limits  LLE   LLE : Exceptions to WFL  AROM " LLE (degrees)  LLE AROM Comment: about 60 degrees flex sitting on EOB  Strength LLE  LLE Overall Strength: Greater than or equal to 3/5 as evidenced by functional mobility (observed slight buckling with WBing)  Outcome Measures:  [facility] Basic Mobility  Turning from your back to your side while in a flat bed without using bedrails: A little  Moving from lying on your back to sitting on the side of a flat bed without using bedrails: A little  Moving to and from bed to chair (including a wheelchair): A little  Standing up from a chair using your arms (e.g. wheelchair or bedside chair): A little  To walk in hospital room: A lot  Climbing 3-5 steps with railing: Total  Basic Mobility - Total Score: 15    Encounter Problems       Encounter Problems (Active)       Balance       LTG - Patient will maintain balance (Progressing)       Start:  10/04/23    Expected End:  10/11/23               Mobility       LTG - Patient will navigate 4-6 steps with rails/device (Progressing)       Start:  10/04/23    Expected End:  10/11/23       Single handrail and supervision         STG - Patient will ambulate (Progressing)       Start:  10/04/23    Expected End:  10/11/23       > 150 ft with wheeled walker and mod indep         ROM (Progressing)       Start:  10/04/23    Expected End:  10/11/23       L knee flex 90 degtrees            Pain  Will manage pain during functional mobility.         Safety       LTG - Patient will demonstrate safety requirements appropriate to situation/environment (Progressing)       Start:  10/04/23    Expected End:  10/11/23               Transfers       STG - Patient will transfer sit to and from stand (Progressing)       Start:  10/04/23    Expected End:  10/11/23       With mod indep using wheeled walker.                Education Documentation  Precautions, taught by Morenita Monteiro, PT at 10/4/2023  3:38 PM.  Learner: Patient  Readiness: Acceptance  Method: Explanation  Response: Verbalizes  Understanding    Mobility Training, taught by Morenita Monteiro, PT at 10/4/2023  3:38 PM.  Learner: Patient  Readiness: Acceptance  Method: Explanation  Response: Verbalizes Understanding    Education Comments  No comments found.

## 2023-10-04 NOTE — OP NOTE
LEFT TOTAL KNEE  ARTHROPLASTY (DEPUY) (L) Operative Note     Date: 10/4/2023  OR Location: [facility]    Name: Gabby Turner, : 1955, Age: 68 y.o., MRN: [id number], Sex: female    Diagnosis  Pre-op Diagnosis     * Osteoarthritis of left knee, unspecified osteoarthritis type [M17.12] Post-op Diagnosis     * Osteoarthritis of left knee, unspecified osteoarthritis type [M17.12]     Procedures    * LEFT TOTAL KNEE  ARTHROPLASTY (DEPUY)    Surgeons      * Kiran Reyes - Primary    Resident/Fellow/Other Assistant:  Surgical Assistant: Makenzie Romo SA    Procedure Summary  Anesthesia: General  ASA: ASA status not filed in the log.  Anesthesia Staff: Anesthesiologist: Ronnie Reina MD  C-AA: FATOUMATA Whitlock  Estimated Blood Loss: 100 mL  Intra-op Medications:   Medication Name Total Dose   vancomycin (Vancocin) vial for injection 2 g   BUPivacaine-EPINEPHrine (Marcaine w/EPI) 0.5 %-1:200,000 injection 20 mL   morphine injection 2 mg   lactated Ringer's infusion Cannot be calculated   fentaNYL (Sublimaze) injection 50 mcg 50 mcg   midazolam (Versed) injection 2 mg 2 mg              Anesthesia Record               Intraprocedure I/O Totals          Intake    lactated Ringer's infusion 1000.00 mL    Total Intake 1000 mL          Specimen: No specimens collected     Staff:   Circulator: Alfred Richardson RN  Scrub Person: Donna Oneal         Drains and/or Catheters: * None in log *    Tourniquet Times:         Implants:  Implants              Findings: Severe osteoarthritis    Indications: Gabby Turner is an 68 y.o. female who is having surgery for * No pre-op diagnosis entered *.  None    The patient was seen in the preoperative area. The risks, benefits, complications, treatment options, non-operative alternatives, expected recovery and outcomes were discussed with the patient. The possibilities of reaction to medication, pulmonary aspiration, injury to surrounding structures, bleeding,  recurrent infection, the need for additional procedures, failure to diagnose a condition, and creating a complication requiring transfusion or operation were discussed with the patient. The patient concurred with the proposed plan, giving informed consent.  The site of surgery was properly noted/marked if necessary per policy. The patient has been actively warmed in preoperative area. Preoperative antibiotics have been ordered and given within 1 hours of incision. Venous thrombosis prophylaxis are not indicated.  After anesthesia the leg was prepped and draped in sterile fashion midline incision followed by trivector arthrotomy was performed patella was pushed aside meniscal remnants osteophytes removed intramedullary drill followed by the 1. Guide under suction was placed into the femur at 5 degree valgus 10 millimeter cut cut was made the femur sized best to a 4 cutting blocks were placed cuts were made followed by the box cut and the femur fit quite well attention was then turned to the tibia intramedullary drill followed by the 1. Guide under suction was placed depth of cut was estimated rotation was obtained with a guide aleja cut was made the tibia sized to a 14we then trialed this with a 12 poly and showed excellent stability and range of motion the sella sized to a 38 was reamed and drilled for direction patellar tracking all trials removed all 3 bones copious lavage dried cement placed components impacted once the cement was hard polyethylene was placed and again initial excellent stability range of motion patellar tracking was then closed deep with 1. Ethibond subcutaneous with 2 Vicryl skin with staples sterile dressing was placed patient returned to PACU in stable condition no complications assistant was required for manipulation of instrumentation and fixation of cutting blocks throughout the case  Procedure Details: See above  Complications:  None; patient tolerated the procedure well.    Disposition:  PACU - hemodynamically stable.  Condition: stable         Additional Details: None    Attending Attestation: I was present and scrubbed for the entire procedure.    Kiran Reyes  Phone Number: [phone]

## 2023-10-04 NOTE — CARE PLAN
The patient's goals for the shift include pain managed and comfort    The clinical goals for the shift include pain managed    Over the shift, the patient did not make progress toward the following goals. Barriers to progression include   Problem: Pain  Goal: Takes deep breaths with improved pain control throughout the shift  Outcome: Progressing  Goal: Turns in bed with improved pain control throughout the shift  Outcome: Progressing  Goal: Walks with improved pain control throughout the shift  Outcome: Progressing  Goal: Performs ADL's with improved pain control throughout shift  Outcome: Progressing  Goal: Participates in PT with improved pain control throughout the shift  Outcome: Progressing  Goal: Free from opioid side effects throughout the shift  Outcome: Progressing  Goal: Free from acute confusion related to pain meds throughout the shift  Outcome: Progressing     Problem: Fall/Injury  Goal: Not fall by end of shift  Outcome: Progressing  Goal: Be free from injury by end of the shift  Outcome: Progressing  Goal: Verbalize understanding of personal risk factors for fall in the hospital  Outcome: Progressing  Goal: Verbalize understanding of risk factor reduction measures to prevent injury from fall in the home  Outcome: Progressing  Goal: Use assistive devices by end of the shift  Outcome: Progressing  Goal: Pace activities to prevent fatigue by end of the shift  Outcome: Progressing   . Recommendations to address these barriers include        .

## 2023-10-04 NOTE — CARE PLAN
The patient's goals for the shift include pain managed and comfort    The clinical goals for the shift include sciatica pain controlled

## 2023-10-04 NOTE — CARE PLAN
Problem: OT Goals  Goal: ADLs  Description: Patient will complete ADLs with Mod I, using AE as needed, in order to increase patient's safety and independence with self-care tasks.  Outcome: Progressing  Goal: Functional Transfers  Description: Patient will complete functional txfers with Mod I, in order to increase patient's safety and independence with ADL tasks.  Outcome: Progressing  Goal: Precautions  Description: Patient will be able to recall knee precautions 100% of the time for patient's safety.  Outcome: Progressing  Goal: IADLs  Description: Patient will complete item retrieval and functional mobility tasks with Mod I, using AE as needed, in order to increase safety and independence with daily functional tasks.  Outcome: Progressing

## 2023-10-04 NOTE — ANESTHESIA PROCEDURE NOTES
Airway  Date/Time: 10/4/2023 10:51 AM  Urgency: elective      Staffing  Performed: EMELY   Authorized by: Ronnie Reina MD    Performed by: FATOUMATA Whitlock  Patient location during procedure: OR    Indications and Patient Condition  Indications for airway management: anesthesia  Spontaneous ventilation: present  Sedation level: deep  Preoxygenated: yes  Mask difficulty assessment: 0 - not attempted    Final Airway Details  Final airway type: supraglottic airway      Successful airway: classic  Size 4     Number of attempts at approach: 1          
Peripheral Block    Patient location during procedure: pre-op  Start time: 10/4/2023 10:10 AM  End time: 10/4/2023 10:25 AM  Reason for block: post-op pain management  Staffing  Performed: attending   Authorized by: Ronnie Reina MD    Performed by: Ronnie Reina MD  Preanesthetic Checklist  Completed: patient identified, IV checked, site marked, risks and benefits discussed, surgical consent, monitors and equipment checked, pre-op evaluation and timeout performed   Timeout performed at:   Peripheral Block  Patient position: laying flat  Prep: ChloraPrep  Patient monitoring: heart rate, cardiac monitor and continuous pulse ox  Block type: adductor canal  Injection technique: single-shot  Needle  Needle gauge: 22 G  Needle length: 10 cm  Needle localization: ultrasound guidance  Assessment  Injection assessment: negative aspiration for heme, no paresthesia on injection, incremental injection and local visualized surrounding nerve on ultrasound  Paresthesia pain: none  Heart rate change: no  Slow fractionated injection: no  Additional Notes  Attempt x1. Needle seen adjacent to femoral artery. Above local seen surrounding artery. See photo. No complications.          
Initial (On Arrival)

## 2023-10-04 NOTE — CARE PLAN
Problem: Balance  Goal: LTG - Patient will maintain balance  Outcome: Progressing     Problem: Mobility  Goal: LTG - Patient will navigate 4-6 steps with rails/device  Description: Single handrail and supervision  Outcome: Progressing  Goal: STG - Patient will ambulate  Description: > 150 ft with wheeled walker and mod indep  Outcome: Progressing  Goal: ROM  Description: L knee flex 90 degtrees  Outcome: Progressing     Problem: Safety  Goal: LTG - Patient will demonstrate safety requirements appropriate to situation/environment  Outcome: Progressing     Problem: Transfers  Goal: STG - Patient will transfer sit to and from stand  Description: With mod indep using wheeled walker.  Outcome: Progressing

## 2023-10-04 NOTE — NURSING NOTE
Patient arrived in her room, alert and orientedx4, on 2 liters of oxygen, pt spouse at bedside, call light within reach.

## 2023-10-04 NOTE — PROGRESS NOTES
Occupational Therapy    Evaluation    Patient Name: Gabby Turner  MRN: [id number]  Today's Date: 10/4/2023  Time Calculation  Start Time: 1500  Stop Time: 1520  Time Calculation (min): 20 min    Assessment  IP OT Assessment  OT Assessment: decline in ADLs and functional mobility s/p L TKA  Prognosis: Good  Barriers to Discharge: None  Evaluation/Treatment Tolerance: Patient tolerated treatment well  Medical Staff Made Aware: Yes  Plan:  OT Frequency: 4 times per week  OT Discharge Recommendations: Low intensity level of continued care  OT Recommended Transfer Status: Minimal assist, Assistive equipment (Comment)  OT - OK to Discharge: Yes    Subjective   Current Problem:  1. Arthritis of left knee  HYDROcodone-acetaminophen (Norco) 5-325 mg tablet    aspirin EC tablet 81 mg    cephalexin (Keflex) 500 mg capsule        General:  General  Reason for Referral: decline ADLs and functional mobility s/p LTKA  Referred By: Dr. Reyes  Past Medical History Relevant to Rehab: Arthritis  Co-Treatment: PT  Co-Treatment Reason: safety with functional txfers post OP  Prior to Session Communication: Bedside nurse  Patient Position Received: Bed, 3 rail up  Preferred Learning Style: verbal  Precautions:  Medical Precautions: Fall precautions, Other (comment) (2L O2 via NC)  Post-Surgical Precautions: Left total knee precautions  Vital Signs:     Pain:  Pain Assessment  Pain Assessment: 0-10  Pain Score: 7  Pain Type: Other (Comment) (siatic)  Pain Location: Leg    Objective   Cognition:  Overall Cognitive Status: Within Functional Limits        Home Living:  Type of Home: House  Lives With: Spouse  Home Adaptive Equipment: Cane  Home Layout: Bed/bath upstairs, Two level, Other (Comment) (split level home, 7 up and 7 steps down with rail)  Home Access: Stairs to enter with rails  Bathroom Shower/Tub: Walk-in shower, Tub/shower unit  Bathroom Equipment: Grab bars in shower   Prior Function:  Level of [place]: Independent with  ADLs and functional transfers, Independent with homemaking with ambulation  ADL Assistance: Independent  Homemaking Assistance: Independent  Ambulatory Assistance: Independent  IADL History:  Homemaking Responsibilities: Yes  IADL Comments: Share tasks with spouse  ADL:  Eating Assistance: Stand by  Eating Deficit: Setup  Grooming Assistance: Stand by  Grooming Deficit: Setup (seated)  Bathing Assistance: Maximal  Bathing Deficit:  (per clinical judgement)  UE Dressing Assistance: Minimal  UE Dressing Deficit: Other (Comment) (gown management)  LE Dressing Assistance: Total  LE Dressing Deficit: Don/doff R sock, Don/doff L sock  Toileting Assistance with Device: Total  Toileting Deficit: Perineal hygiene, Other (Comment) (bed pan)  Activity Tolerance:  Endurance: Other (Comment) (patient tolerates eval)  Bed Mobility/Transfers: Bed Mobility 1  Bed Mobility 1: Supine to sitting  Level of Assistance 1: Moderate assistance  Bed Mobility Comments 1: head of bed elevated, use of grab bars  Bed Mobility 2  Bed Mobility  2: Sitting to supine  Level of Assistance 2: Moderate tactile cues  Bed Mobility Comments 2: assist for trunk support and to raise B LE  Bed Mobility 3  Bed Mobility 3: Rolling right, Rolling left  Level of Assistance 3: Minimum assistance  Bed Mobility Comments 3: use of bed rails   and Transfer 1  Transfer From 1: Sit to, Bed to  Transfer to 1: Stand  Technique 1: Sit to stand  Transfer Device 1: Walker  Transfer Level of Assistance 1: +2, Minimum assistance  Trials/Comments 1: 2 trials. slight buckle    IADL's:   Homemaking Responsibilities: Yes  IADL Comments: Share tasks with spouse    Extremities: RUE   RUE : Within Functional Limits and LUE   LUE: Within Functional Limits    Outcome Measures: [facility] Daily Activity  Putting on and taking off regular lower body clothing: A lot  Bathing (including washing, rinsing, drying): A lot  Putting on and taking off regular upper body clothing: A  little  Toileting, which includes using toilet, bedpan or urinal: Total  Taking care of personal grooming such as brushing teeth: None  Eating Meals: None  Daily Activity - Total Score: 16      Education Documentation  Precautions, taught by Mary Dodge OT at 10/4/2023  3:38 PM.  Learner: Patient  Readiness: Eager  Method: Explanation, Demonstration  Response: Verbalizes Understanding    ADL Training, taught by Mary Dodge OT at 10/4/2023  3:38 PM.  Learner: Patient  Readiness: Eager  Method: Explanation, Demonstration  Response: Verbalizes Understanding    Education Comments  No comments found.      Goals:   Encounter Problems          OT Goals       ADLs (Progressing)       Start:  10/04/23    Expected End:  10/18/23       Patient will complete ADLs with Mod I, using AE as needed, in order to increase patient's safety and independence with self-care tasks.         Functional Transfers (Progressing)       Start:  10/04/23    Expected End:  10/18/23       Patient will complete functional txfers with Mod I, in order to increase patient's safety and independence with ADL tasks.         Precautions (Progressing)       Start:  10/04/23    Expected End:  10/18/23       Patient will be able to recall knee precautions 100% of the time for patient's safety.         IADLs (Progressing)       Start:  10/04/23    Expected End:  10/18/23       Patient will complete item retrieval and functional mobility tasks with Mod I, using AE as needed, in order to increase safety and independence with daily functional tasks.

## 2023-10-04 NOTE — ANESTHESIA POSTPROCEDURE EVALUATION
Patient: Gabby Turner    Procedure Summary       Date: 10/04/23 Room / [facility]: [facility]    Anesthesia Start: 1033 Anesthesia Stop: 1225    Procedure: LEFT TOTAL KNEE  ARTHROPLASTY (DEPUY) (Left: Knee) Diagnosis: Osteoarthritis of left knee, unspecified osteoarthritis type    Surgeons: Kiran Reyes MD Responsible Provider: Ronnie Reina MD    Anesthesia Type: general ASA Status: 3            Anesthesia Type: general    Vitals Value Taken Time   /62 10/04/23 1340   Temp 36.4 °C (97.5 °F) 10/04/23 1340   Pulse 84 10/04/23 1340   Resp 11 10/04/23 1340   SpO2 96 % 10/04/23 1340       Anesthesia Post Evaluation    Patient location during evaluation: PACU  Patient participation: complete - patient participated  Level of consciousness: awake and alert  Pain management: satisfactory to patient  Multimodal analgesia pain management approach  Airway patency: patent  Cardiovascular status: acceptable and blood pressure returned to baseline  Respiratory status: acceptable  Hydration status: acceptable        No notable events documented.

## 2023-10-05 ENCOUNTER — PHARMACY VISIT (OUTPATIENT)
Dept: PHARMACY | Facility: CLINIC | Age: 68
End: 2023-10-05

## 2023-10-05 ENCOUNTER — HOME HEALTH ADMISSION (OUTPATIENT)
Dept: HOME HEALTH SERVICES | Facility: HOME HEALTH | Age: 68
End: 2023-10-05
Payer: MEDICARE

## 2023-10-05 VITALS
RESPIRATION RATE: 16 BRPM | BODY MASS INDEX: 46.84 KG/M2 | DIASTOLIC BLOOD PRESSURE: 65 MMHG | OXYGEN SATURATION: 99 % | TEMPERATURE: 96.8 F | HEART RATE: 72 BPM | WEIGHT: 232.37 LBS | SYSTOLIC BLOOD PRESSURE: 122 MMHG | HEIGHT: 59 IN

## 2023-10-05 LAB
ANION GAP SERPL CALC-SCNC: 12 MMOL/L
BUN SERPL-MCNC: 20 MG/DL (ref 8–25)
CALCIUM SERPL-MCNC: 9.6 MG/DL (ref 8.5–10.4)
CHLORIDE SERPL-SCNC: 101 MMOL/L (ref 97–107)
CO2 SERPL-SCNC: 25 MMOL/L (ref 24–31)
CREAT SERPL-MCNC: 0.9 MG/DL (ref 0.4–1.6)
ERYTHROCYTE [DISTWIDTH] IN BLOOD BY AUTOMATED COUNT: 14.2 % (ref 11.5–14.5)
GFR SERPL CREATININE-BSD FRML MDRD: 69 ML/MIN/1.73M*2
GLUCOSE SERPL-MCNC: 155 MG/DL (ref 65–99)
HCT VFR BLD AUTO: 38.4 % (ref 36–46)
HGB BLD-MCNC: 12.3 G/DL (ref 12–16)
MCH RBC QN AUTO: 29.4 PG (ref 26–34)
MCHC RBC AUTO-ENTMCNC: 32 G/DL (ref 32–36)
MCV RBC AUTO: 92 FL (ref 80–100)
NRBC BLD-RTO: 0 /100 WBCS (ref 0–0)
PLATELET # BLD AUTO: 224 X10*3/UL (ref 150–450)
PMV BLD AUTO: 11.8 FL (ref 7.5–11.5)
POTASSIUM SERPL-SCNC: 3.8 MMOL/L (ref 3.4–5.1)
RBC # BLD AUTO: 4.18 X10*6/UL (ref 4–5.2)
SODIUM SERPL-SCNC: 138 MMOL/L (ref 133–145)
WBC # BLD AUTO: 11.8 X10*3/UL (ref 4.4–11.3)

## 2023-10-05 PROCEDURE — 2500000001 HC RX 250 WO HCPCS SELF ADMINISTERED DRUGS (ALT 637 FOR MEDICARE OP): Performed by: ORTHOPAEDIC SURGERY

## 2023-10-05 PROCEDURE — 97110 THERAPEUTIC EXERCISES: CPT | Mod: GP,CQ

## 2023-10-05 PROCEDURE — 2500000004 HC RX 250 GENERAL PHARMACY W/ HCPCS (ALT 636 FOR OP/ED): Performed by: ORTHOPAEDIC SURGERY

## 2023-10-05 PROCEDURE — 7100000011 HC EXTENDED STAY RECOVERY HOURLY - NURSING UNIT

## 2023-10-05 PROCEDURE — 97530 THERAPEUTIC ACTIVITIES: CPT | Mod: GP,CQ

## 2023-10-05 PROCEDURE — 97116 GAIT TRAINING THERAPY: CPT | Mod: GP,CQ

## 2023-10-05 PROCEDURE — 80048 BASIC METABOLIC PNL TOTAL CA: CPT | Performed by: ORTHOPAEDIC SURGERY

## 2023-10-05 PROCEDURE — 36415 COLL VENOUS BLD VENIPUNCTURE: CPT | Performed by: ORTHOPAEDIC SURGERY

## 2023-10-05 PROCEDURE — RXMED WILLOW AMBULATORY MEDICATION CHARGE

## 2023-10-05 PROCEDURE — 97530 THERAPEUTIC ACTIVITIES: CPT | Mod: GO

## 2023-10-05 PROCEDURE — 97535 SELF CARE MNGMENT TRAINING: CPT | Mod: GO

## 2023-10-05 PROCEDURE — 85027 COMPLETE CBC AUTOMATED: CPT | Performed by: ORTHOPAEDIC SURGERY

## 2023-10-05 RX ORDER — ASPIRIN 81 MG/1
81 TABLET ORAL 2 TIMES DAILY
Qty: 28 TABLET | Refills: 0 | Status: SHIPPED | OUTPATIENT
Start: 2023-10-05 | End: 2023-10-19

## 2023-10-05 RX ADMIN — KETOROLAC TROMETHAMINE 15 MG: 30 INJECTION, SOLUTION INTRAMUSCULAR at 12:30

## 2023-10-05 RX ADMIN — ACETAMINOPHEN 650 MG: 325 TABLET, FILM COATED ORAL at 03:14

## 2023-10-05 RX ADMIN — ACETAMINOPHEN 650 MG: 325 TABLET, FILM COATED ORAL at 09:10

## 2023-10-05 RX ADMIN — FERROUS SULFATE TAB 325 MG (65 MG ELEMENTAL FE) 65 MG OF IRON: 325 (65 FE) TAB at 08:07

## 2023-10-05 RX ADMIN — ASPIRIN 81 MG: 81 TABLET, COATED ORAL at 08:07

## 2023-10-05 RX ADMIN — KETOROLAC TROMETHAMINE 15 MG: 30 INJECTION, SOLUTION INTRAMUSCULAR at 06:04

## 2023-10-05 RX ADMIN — DOCUSATE SODIUM 100 MG: 100 CAPSULE, LIQUID FILLED ORAL at 08:09

## 2023-10-05 RX ADMIN — CEFAZOLIN SODIUM 2 G: 2 INJECTION, SOLUTION INTRAVENOUS at 06:04

## 2023-10-05 RX ADMIN — HYDROCODONE BITARTRATE AND ACETAMINOPHEN 1 TABLET: 5; 325 TABLET ORAL at 08:07

## 2023-10-05 ASSESSMENT — COGNITIVE AND FUNCTIONAL STATUS - GENERAL
HELP NEEDED FOR BATHING: A LITTLE
WALKING IN HOSPITAL ROOM: A LITTLE
WALKING IN HOSPITAL ROOM: A LITTLE
CLIMB 3 TO 5 STEPS WITH RAILING: A LITTLE
MOBILITY SCORE: 18
DRESSING REGULAR LOWER BODY CLOTHING: A LITTLE
DAILY ACTIVITIY SCORE: 21
MOVING FROM LYING ON BACK TO SITTING ON SIDE OF FLAT BED WITH BEDRAILS: A LITTLE
STANDING UP FROM CHAIR USING ARMS: A LITTLE
TURNING FROM BACK TO SIDE WHILE IN FLAT BAD: A LITTLE
CLIMB 3 TO 5 STEPS WITH RAILING: A LITTLE
TOILETING: A LITTLE
MOVING TO AND FROM BED TO CHAIR: A LITTLE
TURNING FROM BACK TO SIDE WHILE IN FLAT BAD: A LITTLE
STANDING UP FROM CHAIR USING ARMS: A LITTLE
MOVING FROM LYING ON BACK TO SITTING ON SIDE OF FLAT BED WITH BEDRAILS: A LITTLE
MOBILITY SCORE: 18
MOVING TO AND FROM BED TO CHAIR: A LITTLE

## 2023-10-05 ASSESSMENT — ACTIVITIES OF DAILY LIVING (ADL)
HOME_MANAGEMENT_TIME_ENTRY: 28
BATHING_WHERE_ASSESSED: SITTING SINKSIDE
BATHING_LEVEL_OF_ASSISTANCE: MINIMUM ASSISTANCE

## 2023-10-05 ASSESSMENT — PAIN SCALES - GENERAL
PAINLEVEL_OUTOF10: 4
PAINLEVEL_OUTOF10: 2
PAINLEVEL_OUTOF10: 4
PAINLEVEL_OUTOF10: 5 - MODERATE PAIN
PAINLEVEL_OUTOF10: 5 - MODERATE PAIN
PAINLEVEL_OUTOF10: 4
PAINLEVEL_OUTOF10: 4
PAINLEVEL_OUTOF10: 5 - MODERATE PAIN
PAINLEVEL_OUTOF10: 4

## 2023-10-05 ASSESSMENT — PAIN - FUNCTIONAL ASSESSMENT
PAIN_FUNCTIONAL_ASSESSMENT: 0-10

## 2023-10-05 NOTE — PROGRESS NOTES
Physical Therapy    Physical Therapy Treatment    Patient Name: Gabby Turner  MRN: [id number]  Today's Date: 10/5/2023  Time Calculation  Start Time: 1332  Stop Time: 1414  Time Calculation (min): 42 min       Assessment/Plan   PT Assessment  PT Assessment Results: Decreased strength, Decreased range of motion, Decreased endurance, Impaired balance, Decreased mobility  Rehab Prognosis: Good  Evaluation/Treatment Tolerance: Patient tolerated treatment well, Patient limited by fatigue, Patient limited by pain  Medical Staff Made Aware: Yes  Strengths: Attitude of self, Support of Caregivers  End of Session Communication: Bedside nurse  Assessment Comment: Pt is a 67 y/o female who is s/p L TKA. Limited by post-op pain, weakness and impaired mobility.  End of Session Patient Position: Up in chair  PT Plan  Inpatient/Swing Bed or Outpatient: Inpatient  PT Plan  Treatment/Interventions: Bed mobility, Transfer training, Gait training, Stair training, Range of motion, Therapeutic exercise, Home exercise program  PT Plan: Skilled PT  PT Frequency: BID  PT Discharge Recommendations: Low intensity level of continued care  Equipment Recommended upon Discharge: Wheeled walker  PT Recommended Transfer Status: Assist x1  PT - OK to Discharge: Yes      General Visit Information:   PT  Visit  PT Received On: 10/05/23  Response to Previous Treatment: Patient with no complaints from previous session.  General  Family/Caregiver Present: Yes (Spouse present, wanting to see pt negotiate stairs again.)  Prior to Session Communication: Bedside nurse  Patient Position Received: Bed, 3 rail up  Preferred Learning Style: verbal, written  General Comment: Pt agreeable to PT, wanting to trial stairs again before discharge home.    Subjective   Precautions:  Precautions  Medical Precautions: Fall precautions  Post-Surgical Precautions: Left total knee precautions  Precautions Comment: Reviewed TKA precautions with pt.  Vital Signs:        Objective   Pain:  Pain Assessment  Pain Assessment: 0-10  Pain Score: 5 - Moderate pain  Pain Type: Surgical pain  Pain Location: Knee  Pain Orientation: Right  Pain Interventions: Cold pack  Cognition:     Postural Control:     Extremity/Trunk Assessments:    Activity Tolerance:  Activity Tolerance  Endurance: Tolerates 10 - 20 min exercise with multiple rests  Treatments:  Therapeutic Exercise  Therapeutic Exercise Performed: Yes  Therapeutic Exercise Activity 1: Ankle pumps x15  Therapeutic Exercise Activity 2: Quad and glute sets x15  Therapeutic Exercise Activity 3: Supine heel slides x15, assisted with left LE  Therapeutic Exercise Activity 4: Supine hip abduction slides x15  Therapeutic Exercise Activity 5: SAQ x15  Therapeutic Exercise Activity 6: SLR x15    Bed Mobility  Bed Mobility: Yes  Bed Mobility 1  Bed Mobility 1: Supine to sitting  Level of Assistance 1: Close supervision  Bed Mobility Comments 1: Pt use of bedrails to assist.    Ambulation/Gait Training  Ambulation/Gait Training Performed: Yes  Ambulation/Gait Training 1  Surface 1: Level tile  Device 1: Rolling walker  Assistance 1: Close supervision  Quality of Gait 1: Inconsistent stride length  Comments/Distance (ft) 1: Pt ambulated with RW ~20' x2 and 100' x1 close supervision. Pt was able to ambulate with reciprocal gait pattern, slow daysi and decreased bilat step length. No left knee buckling.  Transfers  Transfer: Yes  Transfer 1  Transfer From 1: Sit to  Transfer to 1: Stand  Technique 1: Sit to stand  Transfer Level of Assistance 1: Close supervision  Trials/Comments 1: Verbal cues for safe hand placement when transferring to  RW.  Transfers 2  Transfer From 2: Stand to  Transfer to 2: Sit  Technique 2: Stand to sit  Transfer Level of Assistance 2: Close supervision  Trials/Comments 2: Verbal cues for safe hand placement when transferring from RW to chair    Stairs  Stairs: Yes  Stairs  Rails 1: Right  Curb Step 1: No  Device 1:  Single point cane  Assistance 1: Contact guard  Comment/Number of Steps 1: Up/down 4 steps with straight cane and one rail, CGA and verbal cues for cane placement and sequencing non reciprocal pattern.    Outcome Measures:  [facility] Basic Mobility  Turning from your back to your side while in a flat bed without using bedrails: A little  Moving from lying on your back to sitting on the side of a flat bed without using bedrails: A little  Moving to and from bed to chair (including a wheelchair): A little  Standing up from a chair using your arms (e.g. wheelchair or bedside chair): A little  To walk in hospital room: A little  Climbing 3-5 steps with railing: A little  Basic Mobility - Total Score: 18    Education Documentation  Fall Prevention, taught by Katie Hu PTA at 10/5/2023  3:42 PM.  Learner: Significant Other, Patient  Readiness: Eager  Method: Explanation, Handout  Response: Needs Reinforcement, Verbalizes Understanding    Precautions, taught by Katie Hu PTA at 10/5/2023  3:42 PM.  Learner: Significant Other, Patient  Readiness: Eager  Method: Explanation, Handout  Response: Needs Reinforcement, Verbalizes Understanding    Mobility Training, taught by Katie Hu PTA at 10/5/2023  3:42 PM.  Learner: Significant Other, Patient  Readiness: Eager  Method: Explanation, Handout  Response: Needs Reinforcement, Verbalizes Understanding    Fall Prevention, taught by Katie Hu PTA at 10/5/2023 10:15 AM.  Learner: Patient  Readiness: Acceptance  Method: Explanation, Handout  Response: Demonstrated Understanding    Precautions, taught by Katie Hu PTA at 10/5/2023 10:15 AM.  Learner: Patient  Readiness: Acceptance  Method: Explanation, Handout  Response: Demonstrated Understanding    Mobility Training, taught by Katie Hu PTA at 10/5/2023 10:15 AM.  Learner: Patient  Readiness: Acceptance  Method: Explanation, Handout  Response: Demonstrated Understanding    Education Comments  No comments  found.        OP EDUCATION:  Education  Individual(s) Educated: Patient, Spouse  Education Provided: Home Exercise Program (Pt also given handout of stair sequencing using one rail and cane.)    Encounter Problems       Encounter Problems (Active)       Balance       LTG - Patient will maintain balance (Progressing)       Start:  10/04/23    Expected End:  10/11/23               Mobility       LTG - Patient will navigate 4-6 steps with rails/device (Progressing)       Start:  10/04/23    Expected End:  10/11/23       Single handrail and supervision         STG - Patient will ambulate (Progressing)       Start:  10/04/23    Expected End:  10/11/23       > 150 ft with wheeled walker and mod indep         ROM (Progressing)       Start:  10/04/23    Expected End:  10/11/23       L knee flex 90 degtrees            Pain          Safety       LTG - Patient will demonstrate safety requirements appropriate to situation/environment (Progressing)       Start:  10/04/23    Expected End:  10/11/23               Transfers       STG - Patient will transfer sit to and from stand (Progressing)       Start:  10/04/23    Expected End:  10/11/23       With mod indep using wheeled walker.

## 2023-10-05 NOTE — PROGRESS NOTES
Occupational Therapy    Occupational Therapy Treatment    Name: Gabby Turner  MRN: [id number]  : 1955  Date: 10/05/23  Time Calculation  Start Time: 1048  Stop Time: 1126  Time Calculation (min): 38 min    Assessment:  OT Assessment: decline in ADLs and functional mobility s/p L TKA  Prognosis: Good  Barriers to Discharge: None  Evaluation/Treatment Tolerance: Patient tolerated treatment well  Medical Staff Made Aware: Yes  Plan:  OT Frequency: 4 times per week  OT Discharge Recommendations: Low intensity level of continued care  OT Recommended Transfer Status: Stand by assist  OT - OK to Discharge: Yes    Subjective   General:  OT Last Visit  OT Received On: 10/05/23  General  Reason for Referral: decline ADLs and functional mobility s/p LTKA  Referred By: Dr. Reyes  Past Medical History Relevant to Rehab: Arthritis  Family/Caregiver Present: Yes  Caregiver Feedback: spouse initially  Co-Treatment: PT  Co-Treatment Reason: safety with functional txfers post OP  Prior to Session Communication: Bedside nurse  Patient Position Received:  (Pt sitting on bedside commode)  Preferred Learning Style: verbal, written  General Comment: Pt agreeable to PT.    Pain Assessment:  Pain Assessment  Pain Assessment: 0-10  Pain Score: 3  Pain Type: Other (Comment) (siatic)  Pain Location: Knee  Pain Orientation: Left     Objective   Activities of Daily Living: Grooming  Grooming Level of Assistance: Setup  Grooming Where Assessed: Chair (at the sink)    UE Bathing  UE Bathing Level of Assistance: Setup  UE Bathing Where Assessed: Sitting sinkside  UE Bathing Comments: assistance to wash back    LE Bathing  LE Bathing Level of Assistance: Minimum assistance  LE Bathing Where Assessed: Sitting sinkside  LE Bathing Comments: B LE below knees    UE Dressing  UE Dressing Level of Assistance: Setup  UE Dressing Where Assessed: Chair (at sink)  UE Dressing Comments: doff gown, don pullover shirt    LE Dressing  LE Dressing:  Yes  LE Dressing Adaptive Equipment: Reacher  Pants Level of Assistance: Visual aid cues, Minimal verbal cues, Minimum assistance  Sock Level of Assistance: Minimal verbal cues, Setup  LE Dressing Where Assessed: Chair    Toileting  Toileting Level of Assistance: Close supervision  Where Assessed: Other (Comment) (standing at the sink)  Toileting Comments: wash javad area    Functional Standing Tolerance:  Functional Standing Tolerance  Time: 2 minutes  Activity: wash javad area, don underwear and pants over hips standing at the sink  Bed Mobility/Transfers: Transfers  Transfer: Yes  Transfer 1  Transfer From 1: Sit to  Transfer to 1: Stand (patient completes sit to stand txfer from the chair with arms to 2WW 4x with close supervision)  Technique 1: Sit to stand  Transfer Device 1: Walker  Transfer Level of Assistance 1: Close supervision  Trials/Comments 1: 5x throughout tx    Therapy/Activity: Therapeutic Activity  Therapeutic Activity Performed: Yes  Therapeutic Activity 1: patient completes functional mobility to and from the bathroom with close supervision using 2WW    Outcome Measures:  [facility] Daily Activity  Putting on and taking off regular lower body clothing: A little  Bathing (including washing, rinsing, drying): A little  Putting on and taking off regular upper body clothing: None  Toileting, which includes using toilet, bedpan or urinal: A little  Taking care of personal grooming such as brushing teeth: None  Eating Meals: None  Daily Activity - Total Score: 21      Education Documentation  Precautions, taught by Mary Dodge OT at 10/5/2023 12:01 PM.  Learner: Patient  Readiness: Acceptance  Method: Demonstration, Handout  Response: Verbalizes Understanding    ADL Training, taught by Mary Dodge OT at 10/5/2023 12:01 PM.  Learner: Patient  Readiness: Acceptance  Method: Demonstration, Handout  Response: Verbalizes Understanding    Precautions, taught by Mary Dodge OT at 10/4/2023  3:38  PM.  Learner: Patient  Readiness: Eager  Method: Explanation, Demonstration  Response: Verbalizes Understanding    ADL Training, taught by Mary Dodge OT at 10/4/2023  3:38 PM.  Learner: Patient  Readiness: Eager  Method: Explanation, Demonstration  Response: Verbalizes Understanding    Education Comments  No comments found.      Goals:  Encounter Problems          OT Goals       ADLs (Progressing)       Start:  10/04/23    Expected End:  10/18/23       Patient will complete ADLs with Mod I, using AE as needed, in order to increase patient's safety and independence with self-care tasks.         Functional Transfers (Progressing)       Start:  10/04/23    Expected End:  10/18/23       Patient will complete functional txfers with Mod I, in order to increase patient's safety and independence with ADL tasks.         Precautions (Progressing)       Start:  10/04/23    Expected End:  10/18/23       Patient will be able to recall knee precautions 100% of the time for patient's safety.         IADLs (Progressing)       Start:  10/04/23    Expected End:  10/18/23       Patient will complete item retrieval and functional mobility tasks with Mod I, using AE as needed, in order to increase safety and independence with daily functional tasks.

## 2023-10-05 NOTE — DISCHARGE SUMMARY
Discharge Diagnosis  Arthritis of left knee    Issues Requiring Follow-Up  Follow-up 2 weeks with Dr. Reyes    Test Results Pending At Discharge  Pending Labs       No current pending labs.            Hospital Course   Patient was admitted underwent above surgery postoperatively did well remained stable vital signs neurovascular status and labs therefore was discharged home with home physical therapy    Pertinent Physical Exam At Time of Discharge  Physical Exam    Home Medications     Medication List      START taking these medications     cephalexin 500 mg capsule; Commonly known as: Keflex; Take 1 capsule   (500 mg) by mouth 3 times a day for 7 days.   HYDROcodone-acetaminophen 5-325 mg tablet; Commonly known as: Norco;   Take 1 tablet by mouth 6 times a day for 7 days.     CHANGE how you take these medications     aspirin 81 mg EC tablet; Take 1 tablet (81 mg) by mouth 2 times a day   for 14 days.; What changed: when to take this     CONTINUE taking these medications     albuterol 90 mcg/actuation inhaler   benzonatate 100 mg capsule; Commonly known as: Tessalon   cholecalciferol 25 MCG (1000 UT) capsule; Commonly known as: Vitamin D-3   * fluticasone 50 mcg/actuation nasal spray; Commonly known as: Flonase   * fluticasone 50 mcg/actuation nasal spray; Commonly known as: Flonase   montelukast 10 mg tablet; Commonly known as: Singulair   omeprazole 40 mg DR capsule; Commonly known as: PriLOSEC   triamterene-hydrochlorothiazid 37.5-25 mg tablet; Commonly known as:   Maxzide-25  * This list has 2 medication(s) that are the same as other medications   prescribed for you. Read the directions carefully, and ask your doctor or   other care provider to review them with you.     STOP taking these medications     Acetaminophen Extra Strength 500 mg tablet; Generic drug: acetaminophen   ibuprofen 800 mg tablet   meloxicam 15 mg tablet; Commonly known as: Mobic       Outpatient Follow-Up  No future appointments.  Follow-up  2 weeks with Dr. Eric Reyes MD

## 2023-10-05 NOTE — CARE PLAN
Problem: Pain  Goal: Takes deep breaths with improved pain control throughout the shift  Outcome: Progressing  Goal: Turns in bed with improved pain control throughout the shift  Outcome: Progressing  Goal: Walks with improved pain control throughout the shift  Outcome: Progressing  Goal: Performs ADL's with improved pain control throughout shift  Outcome: Progressing  Goal: Participates in PT with improved pain control throughout the shift  Outcome: Progressing  Goal: Free from opioid side effects throughout the shift  Outcome: Progressing  Goal: Free from acute confusion related to pain meds throughout the shift  Outcome: Progressing     Problem: Fall/Injury  Goal: Not fall by end of shift  Outcome: Progressing  Goal: Be free from injury by end of the shift  Outcome: Progressing  Goal: Verbalize understanding of personal risk factors for fall in the hospital  Outcome: Progressing  Goal: Verbalize understanding of risk factor reduction measures to prevent injury from fall in the home  Outcome: Progressing  Goal: Use assistive devices by end of the shift  Outcome: Progressing  Goal: Pace activities to prevent fatigue by end of the shift  Outcome: Progressing     Problem: Pain  Goal: STG - Pain is manageable through therapies  Outcome: Progressing   The patient's goals for the shift include pain managed and comfort    The clinical goals for the shift include sciatica pain controlled    Over the shift, the patient did not make progress toward the following goals. Barriers to progression include . Recommendations to address these barriers include .

## 2023-10-05 NOTE — CARE PLAN
Problem: Balance  Goal: LTG - Patient will maintain balance  10/5/2023 1539 by Katie Hu, PTA  Outcome: Progressing  10/5/2023 1013 by Katie Hu, PTA  Outcome: Progressing     Problem: Mobility  Goal: LTG - Patient will navigate 4-6 steps with rails/device  Description: Single handrail and supervision  10/5/2023 1539 by Katie Hu, PTA  Outcome: Progressing  10/5/2023 1013 by Katie Hu, PTA  Outcome: Progressing  Goal: STG - Patient will ambulate  Description: > 150 ft with wheeled walker and mod indep  10/5/2023 1539 by Katie Hu, PTA  Outcome: Progressing  10/5/2023 1013 by Katie Hu, PTA  Outcome: Progressing  Goal: ROM  Description: L knee flex 90 degtrees  10/5/2023 1539 by Katie Hu, PTA  Outcome: Progressing  10/5/2023 1013 by Katie Hu, PTA  Outcome: Progressing     Problem: Safety  Goal: LTG - Patient will demonstrate safety requirements appropriate to situation/environment  10/5/2023 1539 by Katie Hu, PTA  Outcome: Progressing  10/5/2023 1013 by Katie Hu, PTA  Outcome: Progressing     Problem: Transfers  Goal: STG - Patient will transfer sit to and from stand  Description: With mod indep using wheeled walker.  10/5/2023 1539 by Katie Hu, PTA  Outcome: Progressing  10/5/2023 1013 by Katie Hu, PTA  Outcome: Progressing

## 2023-10-05 NOTE — DISCHARGE INSTRUCTIONS
Continue to ice and elevate as much as possible  Leave dressing on until follow-up appointment  Follow-up with Dr. Reyes in 2 weeks  Activity as tolerated

## 2023-10-05 NOTE — PROGRESS NOTES
Physical Therapy    Physical Therapy Treatment    Patient Name: Gabby Turner  MRN: [id number]  Today's Date: 10/5/2023  Time Calculation  Start Time: 0859  Stop Time: 0950  Time Calculation (min): 51 min       Assessment/Plan   PT Assessment  PT Assessment Results: Decreased strength, Decreased range of motion, Decreased endurance, Impaired balance, Decreased mobility  Rehab Prognosis: Good  Evaluation/Treatment Tolerance: Patient limited by pain  Medical Staff Made Aware: Yes  Strengths: Attitude of self, Support of Caregivers  End of Session Communication: Bedside nurse  Assessment Comment: Pt is a 69 y/o female who is s/p L TKA. Limited by post-op pain, weakness and impaired mobility.  End of Session Patient Position: Up in chair  PT Plan  Inpatient/Swing Bed or Outpatient: Inpatient  PT Plan  Treatment/Interventions: Transfer training, Gait training, Stair training, Therapeutic exercise  PT Plan: Skilled PT  PT Frequency: BID  PT Discharge Recommendations: Low intensity level of continued care  Equipment Recommended upon Discharge: Wheeled walker  PT Recommended Transfer Status: Assist x1  PT - OK to Discharge: Yes      General Visit Information:   PT  Visit  PT Received On: 10/05/23  General  Prior to Session Communication: Bedside nurse  Patient Position Received:  (Pt sitting on bedside commode)  Preferred Learning Style: verbal, written  General Comment: Pt agreeable to PT.    Subjective   Precautions:  Precautions  Medical Precautions: Fall precautions  Post-Surgical Precautions: Left total knee precautions  Precautions Comment: Pt able to state 1/3 TKA precautions. Reviewed TKA precautions with pt.  Vital Signs:       Objective   Pain:  Pain Assessment  Pain Assessment: 0-10  Pain Score: 4  Pain Location: Knee  Pain Orientation: Left  Cognition:     Postural Control:     Extremity/Trunk Assessments:    Activity Tolerance:     Treatments:  Therapeutic Exercise  Therapeutic Exercise Performed:  Yes  Therapeutic Exercise Activity 1: Ankle pumps/heel raises x15  Therapeutic Exercise Activity 2: LAQ x15  Therapeutic Exercise Activity 3: Seated hip flexion x15  Therapeutic Exercise Activity 4: Jamee hip adduction x15  Therapeutic Exercise Activity 5: resisted hip abduction x15  Therapeutic Exercise Activity 6: Seated heel slides left x15    Ambulation/Gait Training  Ambulation/Gait Training Performed: Yes  Ambulation/Gait Training 1  Surface 1: Level tile  Device 1: Rolling walker  Assistance 1: Close supervision  Comments/Distance (ft) 1: Pt ambulated with RW ~20' x3 and 90' x1 close supervision. Pt initially demonstrating step to gait, needed verbal cues for sequencing. Pt was able to progress to reciprocal gait with verbal cues and distance. No left knee buckling.  Transfers  Transfer: Yes  Transfer 1  Transfer From 1: Sit to  Transfer to 1: Stand, Commode-standard  Technique 1: Sit to stand  Transfer Level of Assistance 1: Close supervision  Trials/Comments 1: Verbal cues for safe hand placement when transferring to  RW.  Transfers 2  Transfer From 2: Stand to  Transfer to 2: Sit, Chair with arms  Technique 2: Stand to sit  Transfer Level of Assistance 2: Close supervision  Trials/Comments 2: Verbal cues for safe hand placement when transferring from RW to chair    Stairs  Stairs: Yes  Stairs  Rails 1: Right  Curb Step 1: No  Device 1: Single point cane  Assistance 1: Contact guard  Comment/Number of Steps 1: Up/down 4 steps with one rail and straight cane, CGA and verbal cues for cane placement and sequencing non reciprocal pattern.    Outcome Measures:  [facility] Basic Mobility  Turning from your back to your side while in a flat bed without using bedrails: A little  Moving from lying on your back to sitting on the side of a flat bed without using bedrails: A little  Moving to and from bed to chair (including a wheelchair): A little  Standing up from a chair using your arms (e.g. wheelchair or bedside chair):  A little  To walk in hospital room: A little  Climbing 3-5 steps with railing: A little  Basic Mobility - Total Score: 18    Education Documentation  Fall Prevention, taught by Katie Hu PTA at 10/5/2023 10:15 AM.  Learner: Patient  Readiness: Acceptance  Method: Explanation, Handout  Response: Demonstrated Understanding    Precautions, taught by Katie Hu PTA at 10/5/2023 10:15 AM.  Learner: Patient  Readiness: Acceptance  Method: Explanation, Handout  Response: Demonstrated Understanding    Mobility Training, taught by Katie Hu PTA at 10/5/2023 10:15 AM.  Learner: Patient  Readiness: Acceptance  Method: Explanation, Handout  Response: Demonstrated Understanding    Education Comments  No comments found.        OP EDUCATION:  Education  Individual(s) Educated: Patient  Education Provided: Post-Op Precautions, Home Exercise Program    Encounter Problems       Encounter Problems (Active)       Balance       LTG - Patient will maintain balance (Progressing)       Start:  10/04/23    Expected End:  10/11/23               Mobility       LTG - Patient will navigate 4-6 steps with rails/device (Progressing)       Start:  10/04/23    Expected End:  10/11/23       Single handrail and supervision         STG - Patient will ambulate (Progressing)       Start:  10/04/23    Expected End:  10/11/23       > 150 ft with wheeled walker and mod indep         ROM (Progressing)       Start:  10/04/23    Expected End:  10/11/23       L knee flex 90 degtrees            Pain          Safety       LTG - Patient will demonstrate safety requirements appropriate to situation/environment (Progressing)       Start:  10/04/23    Expected End:  10/11/23               Transfers       STG - Patient will transfer sit to and from stand (Progressing)       Start:  10/04/23    Expected End:  10/11/23       With mod indep using wheeled walker.

## 2023-10-05 NOTE — PROGRESS NOTES
Patient would like [facility] going home today.  Referral to curry Doyle, for processing.    Aida Matos RN

## 2023-10-07 ENCOUNTER — HOME CARE VISIT (OUTPATIENT)
Dept: HOME HEALTH SERVICES | Facility: HOME HEALTH | Age: 68
End: 2023-10-07
Payer: MEDICARE

## 2023-10-07 VITALS
TEMPERATURE: 98.3 F | RESPIRATION RATE: 18 BRPM | WEIGHT: 218 LBS | SYSTOLIC BLOOD PRESSURE: 177 MMHG | HEIGHT: 59 IN | HEART RATE: 87 BPM | BODY MASS INDEX: 43.95 KG/M2 | DIASTOLIC BLOOD PRESSURE: 82 MMHG

## 2023-10-07 PROCEDURE — 0023 HH SOC

## 2023-10-07 PROCEDURE — 169592 NO-PAY CLAIM PROCEDURE

## 2023-10-07 PROCEDURE — G0151 HHCP-SERV OF PT,EA 15 MIN: HCPCS | Mod: HHH

## 2023-10-07 SDOH — HEALTH STABILITY: PHYSICAL HEALTH: EXERCISE TYPE: WRITTEN

## 2023-10-07 ASSESSMENT — ACTIVITIES OF DAILY LIVING (ADL)
AMBULATION ASSISTANCE: STAND BY ASSIST
ENTERING_EXITING_HOME: MINIMUM ASSIST
AMBULATION ASSISTANCE ON FLAT SURFACES: 1
AMBULATION_DISTANCE/DURATION_TOLERATED: 50'
AMBULATION ASSISTANCE: 1
CURRENT_FUNCTION: STAND BY ASSIST
PHYSICAL TRANSFERS ASSESSED: 1

## 2023-10-07 ASSESSMENT — ENCOUNTER SYMPTOMS
PAIN LOCATION: LEFT KNEE
LOWEST PAIN SEVERITY IN PAST 24 HOURS: 3/10
PAIN SEVERITY GOAL: 5/10
PAIN LOCATION - PAIN DURATION: VARIES
SUBJECTIVE PAIN PROGRESSION: GRADUALLY IMPROVING
PAIN LOCATION - PAIN SEVERITY: 6/10
HIGHEST PAIN SEVERITY IN PAST 24 HOURS: 7/10
PAIN LOCATION - PAIN FREQUENCY: INTERMITTENT
SHORTNESS OF BREATH: T
ARTHRALGIAS: 1
PAIN: 1
PAIN LOCATION - RELIEVING FACTORS: MEDS,ICE
PAIN LOCATION - EXACERBATING FACTORS: MVMT
PAIN LOCATION - PAIN QUALITY: ACHING
MUSCLE WEAKNESS: 1

## 2023-10-10 ENCOUNTER — HOME CARE VISIT (OUTPATIENT)
Dept: HOME HEALTH SERVICES | Facility: HOME HEALTH | Age: 68
End: 2023-10-10
Payer: MEDICARE

## 2023-10-10 VITALS
TEMPERATURE: 97.2 F | DIASTOLIC BLOOD PRESSURE: 80 MMHG | HEART RATE: 71 BPM | RESPIRATION RATE: 18 BRPM | OXYGEN SATURATION: 97 % | SYSTOLIC BLOOD PRESSURE: 142 MMHG

## 2023-10-10 PROCEDURE — G0157 HHC PT ASSISTANT EA 15: HCPCS

## 2023-10-11 ASSESSMENT — ENCOUNTER SYMPTOMS
LIMITED RANGE OF MOTION: 1
PERSON REPORTING PAIN: PATIENT
PAIN: 1
PAIN LOCATION - PAIN DURATION: INTERMITTENT
PAIN LOCATION - RELIEVING FACTORS: REST, ICE, MEDICATION
PAIN LOCATION - PAIN SEVERITY: 5/10
PAIN LOCATION - PAIN FREQUENCY: FREQUENT
PAIN LOCATION: LEFT KNEE
MUSCLE WEAKNESS: 1
SUBJECTIVE PAIN PROGRESSION: UNCHANGED

## 2023-10-11 ASSESSMENT — ACTIVITIES OF DAILY LIVING (ADL)
AMBULATION ASSISTANCE: STAND BY ASSIST
AMBULATION ASSISTANCE ON FLAT SURFACES: 1
AMBULATION_DISTANCE/DURATION_TOLERATED: 60 FEET
CURRENT_FUNCTION: STAND BY ASSIST

## 2023-10-12 ENCOUNTER — TELEPHONE (OUTPATIENT)
Dept: ORTHOPEDIC SURGERY | Facility: CLINIC | Age: 68
End: 2023-10-12
Payer: MEDICARE

## 2023-10-12 DIAGNOSIS — M17.12 ARTHRITIS OF LEFT KNEE: ICD-10-CM

## 2023-10-12 DIAGNOSIS — Z96.652 STATUS POST LEFT KNEE REPLACEMENT: ICD-10-CM

## 2023-10-12 RX ORDER — HYDROCODONE BITARTRATE AND ACETAMINOPHEN 5; 325 MG/1; MG/1
1 TABLET ORAL EVERY 4 HOURS PRN
Qty: 36 TABLET | Refills: 0 | Status: SHIPPED | OUTPATIENT
Start: 2023-10-12 | End: 2023-10-18

## 2023-10-13 ENCOUNTER — HOME CARE VISIT (OUTPATIENT)
Dept: HOME HEALTH SERVICES | Facility: HOME HEALTH | Age: 68
End: 2023-10-13
Payer: MEDICARE

## 2023-10-13 VITALS — TEMPERATURE: 96.5 F | HEART RATE: 75 BPM | OXYGEN SATURATION: 97 %

## 2023-10-13 PROCEDURE — G0157 HHC PT ASSISTANT EA 15: HCPCS

## 2023-10-13 SDOH — HEALTH STABILITY: PHYSICAL HEALTH
EXERCISE COMMENTS: SUPINE AND SEATED THER EX 10 REPS AS FOLLOWS:    SUPINE:  AP  SAQ  HEEL SLIDES  HIP ABDUCTION  QUAD SETS  GLUTE SETS    SEATED THER EX:     AP  LAQ  HAMSTRING CURLS  MARCHING

## 2023-10-13 ASSESSMENT — ACTIVITIES OF DAILY LIVING (ADL)
AMBULATION_DISTANCE/DURATION_TOLERATED: 60 FEET
AMBULATION ASSISTANCE ON FLAT SURFACES: 1

## 2023-10-13 ASSESSMENT — PAIN DESCRIPTION - PAIN TYPE: TYPE: SURGICAL PAIN

## 2023-10-17 ENCOUNTER — HOME CARE VISIT (OUTPATIENT)
Dept: HOME HEALTH SERVICES | Facility: HOME HEALTH | Age: 68
End: 2023-10-17
Payer: MEDICARE

## 2023-10-17 VITALS
RESPIRATION RATE: 18 BRPM | SYSTOLIC BLOOD PRESSURE: 136 MMHG | OXYGEN SATURATION: 96 % | HEART RATE: 74 BPM | TEMPERATURE: 97.2 F | DIASTOLIC BLOOD PRESSURE: 80 MMHG

## 2023-10-17 PROCEDURE — G0157 HHC PT ASSISTANT EA 15: HCPCS

## 2023-10-17 SDOH — HEALTH STABILITY: PHYSICAL HEALTH
EXERCISE COMMENTS: SEATED THER EX 15 REPS:   AP  LAQ  MARCHES  HAMSTRING CURLS    STANDING THER EX 10 REPS B LE WITH B UE SUPPORT:    HEEL RAISES  HIP ABDUCTION  HAMSTRING CURLS  HIP EXTENSION  MINI SQUATS  LEFT KNEE FLEXION STRETCH ON STEP 10 SEC X 5     REVIEWED SUPI

## 2023-10-17 SDOH — HEALTH STABILITY: PHYSICAL HEALTH: EXERCISE COMMENTS: NE THER EX AS PATIENT PERFORMED PRIOR TO PTA ARRIVAL

## 2023-10-17 ASSESSMENT — ACTIVITIES OF DAILY LIVING (ADL)
AMBULATION ASSISTANCE ON FLAT SURFACES: 1
AMBULATION_DISTANCE/DURATION_TOLERATED: 60 FEET X 2

## 2023-10-17 ASSESSMENT — PAIN DESCRIPTION - PAIN TYPE: TYPE: SURGICAL PAIN

## 2023-10-19 ENCOUNTER — HOME CARE VISIT (OUTPATIENT)
Dept: HOME HEALTH SERVICES | Facility: HOME HEALTH | Age: 68
End: 2023-10-19
Payer: MEDICARE

## 2023-10-19 VITALS
SYSTOLIC BLOOD PRESSURE: 138 MMHG | TEMPERATURE: 96.5 F | HEART RATE: 73 BPM | DIASTOLIC BLOOD PRESSURE: 78 MMHG | RESPIRATION RATE: 18 BRPM | OXYGEN SATURATION: 97 %

## 2023-10-19 PROCEDURE — G0157 HHC PT ASSISTANT EA 15: HCPCS

## 2023-10-19 ASSESSMENT — ENCOUNTER SYMPTOMS
SUBJECTIVE PAIN PROGRESSION: UNCHANGED
PAIN LOCATION - PAIN DURATION: INTERMITTENT
PAIN LOCATION - PAIN FREQUENCY: INTERMITTENT
PAIN LOCATION - EXACERBATING FACTORS: ACTIVITY
HIGHEST PAIN SEVERITY IN PAST 24 HOURS: 6/10
PAIN LOCATION - RELIEVING FACTORS: REST, ICE, MEDICATION
PAIN SEVERITY GOAL: 0/10
PAIN LOCATION - PAIN QUALITY: PRESSURE
PAIN LOCATION - PAIN SEVERITY: 2/10
PAIN LOCATION: LEFT KNEE
PERSON REPORTING PAIN: PATIENT
LOWEST PAIN SEVERITY IN PAST 24 HOURS: 2/10
PAIN: 1

## 2023-10-20 ENCOUNTER — OFFICE VISIT (OUTPATIENT)
Dept: ORTHOPEDIC SURGERY | Facility: CLINIC | Age: 68
End: 2023-10-20
Payer: MEDICARE

## 2023-10-20 VITALS — HEIGHT: 59 IN | WEIGHT: 218 LBS | BODY MASS INDEX: 43.95 KG/M2

## 2023-10-20 DIAGNOSIS — Z96.652 STATUS POST TOTAL LEFT KNEE REPLACEMENT: Primary | ICD-10-CM

## 2023-10-20 PROCEDURE — 1125F AMNT PAIN NOTED PAIN PRSNT: CPT | Performed by: SURGERY

## 2023-10-20 PROCEDURE — 1160F RVW MEDS BY RX/DR IN RCRD: CPT | Performed by: SURGERY

## 2023-10-20 PROCEDURE — 1036F TOBACCO NON-USER: CPT | Performed by: SURGERY

## 2023-10-20 PROCEDURE — 99024 POSTOP FOLLOW-UP VISIT: CPT | Performed by: SURGERY

## 2023-10-20 PROCEDURE — 1159F MED LIST DOCD IN RCRD: CPT | Performed by: SURGERY

## 2023-10-20 ASSESSMENT — PAIN SCALES - GENERAL: PAINLEVEL_OUTOF10: 5 - MODERATE PAIN

## 2023-10-20 ASSESSMENT — PAIN - FUNCTIONAL ASSESSMENT: PAIN_FUNCTIONAL_ASSESSMENT: 0-10

## 2023-10-20 NOTE — PROGRESS NOTES
Subjective    Patient ID: Nate Rodriguez is a 68 y.o. female.    Chief Complaint: Post-op of the Left Knee (S/P TKA)     Last Surgery: Left Total Knee  Arthroplasty (depuy) - Left  Last Surgery Date: 10/4/2023    HPI 2 weeks status post left total knee doing very well    Objective   Ortho Exam range of motion is 0 to 100 degrees wounds well-healed neurovascular intact no effusion 1+ distal edema walking well    Image Results:  XR knee complete 4 or more views  Narrative: Interpreted By:  ILIR PÉREZ MD  MRN: [id number]  Patient Name: NATE RODRIGUEZ     STUDY:  KNEE; COMPLT, 4 OR MORE VIEWS;  6/16/2023 10:13 am     INDICATION:  PAIN  M17.12: Arthritis of left knee.     COMPARISON:  06/03/2022     ACCESSION NUMBER(S):  [id number]     ORDERING CLINICIAN:  ALAINA EUGENE     FINDINGS:  Bilateral knees, four views of the left and three views of the right     There is severe medial compartment joint space narrowing with  osteophytosis on the left with moderate changes the lateral and  patellofemoral compartments. There is a small effusion. There is mild  genu varum. Right total knee arthroplasty in place without evidence  of periprosthetic fracture or lucency. There is no dislocation     Impression: Severe left knee osteoarthritis worse in the medial compartment with  genu varum  No hardware failure about the right total knee arthroplasty      Assessment/Plan doing very well will continue in physical therapy outpatient prescription was given we will get her staples out I will see her in 4 weeks  Encounter Diagnoses:  No diagnosis found.    No orders of the defined types were placed in this encounter.    No follow-ups on file.

## 2023-10-24 ENCOUNTER — HOME CARE VISIT (OUTPATIENT)
Dept: HOME HEALTH SERVICES | Facility: HOME HEALTH | Age: 68
End: 2023-10-24
Payer: MEDICARE

## 2023-10-24 VITALS
RESPIRATION RATE: 18 BRPM | DIASTOLIC BLOOD PRESSURE: 78 MMHG | SYSTOLIC BLOOD PRESSURE: 138 MMHG | OXYGEN SATURATION: 93 % | HEART RATE: 82 BPM | TEMPERATURE: 97.2 F

## 2023-10-24 PROCEDURE — G0157 HHC PT ASSISTANT EA 15: HCPCS

## 2023-10-24 ASSESSMENT — ENCOUNTER SYMPTOMS
PAIN LOCATION - PAIN QUALITY: PRESSURE
PAIN LOCATION - PAIN DURATION: INTERMITTENT
HIGHEST PAIN SEVERITY IN PAST 24 HOURS: 5/10
SUBJECTIVE PAIN PROGRESSION: GRADUALLY IMPROVING
PAIN LOCATION - PAIN FREQUENCY: INTERMITTENT
PAIN LOCATION: LEFT KNEE
PAIN LOCATION - EXACERBATING FACTORS: ACTIVITY
PERSON REPORTING PAIN: PATIENT
PAIN LOCATION - RELIEVING FACTORS: REST, ICE, MEDICATION
PAIN: 1
LOWEST PAIN SEVERITY IN PAST 24 HOURS: 1/10
PAIN SEVERITY GOAL: 0/10
PAIN LOCATION - PAIN SEVERITY: 3/10

## 2023-10-24 ASSESSMENT — ACTIVITIES OF DAILY LIVING (ADL): OASIS_M1830: 03

## 2023-10-27 ENCOUNTER — HOME CARE VISIT (OUTPATIENT)
Dept: HOME HEALTH SERVICES | Facility: HOME HEALTH | Age: 68
End: 2023-10-27
Payer: MEDICARE

## 2023-10-27 ENCOUNTER — APPOINTMENT (OUTPATIENT)
Dept: HOME HEALTH SERVICES | Facility: HOME HEALTH | Age: 68
End: 2023-10-27
Payer: MEDICARE

## 2023-10-27 VITALS
HEART RATE: 90 BPM | SYSTOLIC BLOOD PRESSURE: 150 MMHG | RESPIRATION RATE: 18 BRPM | TEMPERATURE: 97.4 F | DIASTOLIC BLOOD PRESSURE: 90 MMHG | OXYGEN SATURATION: 97 %

## 2023-10-27 PROCEDURE — G0157 HHC PT ASSISTANT EA 15: HCPCS

## 2023-10-27 ASSESSMENT — ENCOUNTER SYMPTOMS
PAIN LOCATION - PAIN QUALITY: SHARP
LOWEST PAIN SEVERITY IN PAST 24 HOURS: 1/10
PAIN LOCATION - PAIN SEVERITY: 3/10
PAIN LOCATION: LEFT KNEE
HIGHEST PAIN SEVERITY IN PAST 24 HOURS: 4/10
PAIN LOCATION - RELIEVING FACTORS: REST, ICE, MEDICATION
PERSON REPORTING PAIN: PATIENT
PAIN LOCATION - PAIN FREQUENCY: INTERMITTENT
PAIN SEVERITY GOAL: 0/10
PAIN LOCATION - PAIN DURATION: INTERMITTENT
PAIN: 1
SUBJECTIVE PAIN PROGRESSION: GRADUALLY IMPROVING
PAIN LOCATION - EXACERBATING FACTORS: ACTIVITY

## 2023-10-28 DIAGNOSIS — M17.0 BILATERAL PRIMARY OSTEOARTHRITIS OF KNEE: ICD-10-CM

## 2023-10-30 ENCOUNTER — HOME CARE VISIT (OUTPATIENT)
Dept: HOME HEALTH SERVICES | Facility: HOME HEALTH | Age: 68
End: 2023-10-30
Payer: MEDICARE

## 2023-10-30 VITALS
RESPIRATION RATE: 18 BRPM | DIASTOLIC BLOOD PRESSURE: 82 MMHG | SYSTOLIC BLOOD PRESSURE: 140 MMHG | OXYGEN SATURATION: 97 % | TEMPERATURE: 96.8 F | HEART RATE: 72 BPM

## 2023-10-30 PROCEDURE — G0157 HHC PT ASSISTANT EA 15: HCPCS

## 2023-10-30 RX ORDER — IBUPROFEN 800 MG/1
800 TABLET ORAL
Qty: 90 TABLET | Refills: 1 | Status: SHIPPED | OUTPATIENT
Start: 2023-10-30 | End: 2023-12-19

## 2023-10-30 ASSESSMENT — ENCOUNTER SYMPTOMS
PAIN LOCATION - PAIN DURATION: INTERMITTENT
PAIN LOCATION - PAIN QUALITY: PRESSURE
SUBJECTIVE PAIN PROGRESSION: GRADUALLY IMPROVING
PAIN LOCATION: LEFT KNEE
LOWEST PAIN SEVERITY IN PAST 24 HOURS: 1/10
PAIN LOCATION - PAIN FREQUENCY: INTERMITTENT
HIGHEST PAIN SEVERITY IN PAST 24 HOURS: 4/10
PAIN SEVERITY GOAL: 0/10
PAIN LOCATION - PAIN SEVERITY: 3/10
PAIN LOCATION - EXACERBATING FACTORS: ACTIVITY
PAIN LOCATION - RELIEVING FACTORS: REST, ICE, MEDICATION

## 2023-11-01 PROCEDURE — G0180 MD CERTIFICATION HHA PATIENT: HCPCS | Performed by: ORTHOPAEDIC SURGERY

## 2023-11-04 ENCOUNTER — HOME CARE VISIT (OUTPATIENT)
Dept: HOME HEALTH SERVICES | Facility: HOME HEALTH | Age: 68
End: 2023-11-04
Payer: MEDICARE

## 2023-11-04 VITALS
HEART RATE: 94 BPM | OXYGEN SATURATION: 97 % | RESPIRATION RATE: 18 BRPM | SYSTOLIC BLOOD PRESSURE: 154 MMHG | DIASTOLIC BLOOD PRESSURE: 83 MMHG | TEMPERATURE: 97.9 F

## 2023-11-04 PROCEDURE — G0151 HHCP-SERV OF PT,EA 15 MIN: HCPCS

## 2023-11-04 SDOH — HEALTH STABILITY: PHYSICAL HEALTH: EXERCISE TYPE: WRITTEN

## 2023-11-04 SDOH — HEALTH STABILITY: PHYSICAL HEALTH: PHYSICAL EXERCISE: 20

## 2023-11-04 ASSESSMENT — ACTIVITIES OF DAILY LIVING (ADL)
AMBULATION ASSISTANCE: 1
PHYSICAL TRANSFERS ASSESSED: 1
HOME_HEALTH_OASIS: 00
AMBULATION ASSISTANCE: INDEPENDENT
OASIS_M1830: 00
CURRENT_FUNCTION: INDEPENDENT

## 2023-11-04 ASSESSMENT — ENCOUNTER SYMPTOMS
PAIN SEVERITY GOAL: 0/10
SUBJECTIVE PAIN PROGRESSION: GRADUALLY IMPROVING
PAIN: 1
PAIN LOCATION - RELIEVING FACTORS: REST
PERSON REPORTING PAIN: PATIENT
PAIN LOCATION - PAIN QUALITY: ACHING
PAIN LOCATION - PAIN FREQUENCY: INTERMITTENT
PAIN LOCATION - PAIN SEVERITY: 3/10
PAIN LOCATION - EXACERBATING FACTORS: MVMT
PAIN LOCATION - PAIN DURATION: VARIES
LOWEST PAIN SEVERITY IN PAST 24 HOURS: 0/10
PAIN LOCATION: LEFT KNEE

## 2023-11-15 ENCOUNTER — OFFICE VISIT (OUTPATIENT)
Dept: ORTHOPEDIC SURGERY | Facility: CLINIC | Age: 68
End: 2023-11-15
Payer: MEDICARE

## 2023-11-15 DIAGNOSIS — Z96.652 STATUS POST LEFT KNEE REPLACEMENT: Primary | ICD-10-CM

## 2023-11-15 PROCEDURE — 1036F TOBACCO NON-USER: CPT | Performed by: SURGERY

## 2023-11-15 PROCEDURE — 1160F RVW MEDS BY RX/DR IN RCRD: CPT | Performed by: SURGERY

## 2023-11-15 PROCEDURE — 99024 POSTOP FOLLOW-UP VISIT: CPT | Performed by: SURGERY

## 2023-11-15 PROCEDURE — 1159F MED LIST DOCD IN RCRD: CPT | Performed by: SURGERY

## 2023-11-15 PROCEDURE — 1125F AMNT PAIN NOTED PAIN PRSNT: CPT | Performed by: SURGERY

## 2023-11-15 NOTE — PROGRESS NOTES
Subjective    Patient ID: Nate Rodriguez is a 68 y.o. female.    Chief Complaint: s/p L TKA 10/04/23     Last Surgery: No surgery found  Last Surgery Date: No surgery found    HPI   She is 6 weeks post op left total knee she has been having issues with swelling she is still having aches and pains in the knee still working in physical therapy getting along with a cane    Objective   Ortho Exam  The left knee shows a well-healing incision is slightly erythematous she does have a significant amount of swelling in this leg still 2+ distal edema skin is flaking range of motion 0-1 15 good stability no areas of reproducible tenderness in the joint neurovascular intact    Image Results:  XR knee complete 4 or more views  Narrative: Interpreted By:  ILIR PÉREZ MD  MRN: [id number]  Patient Name: NATE RODRIGUEZ     STUDY:  KNEE; COMPLT, 4 OR MORE VIEWS;  6/16/2023 10:13 am     INDICATION:  PAIN  M17.12: Arthritis of left knee.     COMPARISON:  06/03/2022     ACCESSION NUMBER(S):  [id number]     ORDERING CLINICIAN:  ALAINA EUGENE     FINDINGS:  Bilateral knees, four views of the left and three views of the right     There is severe medial compartment joint space narrowing with  osteophytosis on the left with moderate changes the lateral and  patellofemoral compartments. There is a small effusion. There is mild  genu varum. Right total knee arthroplasty in place without evidence  of periprosthetic fracture or lucency. There is no dislocation     Impression: Severe left knee osteoarthritis worse in the medial compartment with  genu varum  No hardware failure about the right total knee arthroplasty      Assessment/Plan   Encounter Diagnoses:  Status post left knee replacement  She is 6 weeks status post her left total knee she is doing well she is getting along nicely with a cane and still working in physical.  Like her to work on getting the swelling out of her legs discussed compression stockings and aggressive elevation  at this point and we will see her again in another 6 weeks    No orders of the defined types were placed in this encounter.    No follow-ups on file.

## 2023-11-17 ENCOUNTER — APPOINTMENT (OUTPATIENT)
Dept: ORTHOPEDIC SURGERY | Facility: CLINIC | Age: 68
End: 2023-11-17
Payer: MEDICARE

## 2023-12-14 ENCOUNTER — OFFICE VISIT (OUTPATIENT)
Dept: ORTHOPEDIC SURGERY | Facility: CLINIC | Age: 68
End: 2023-12-14
Payer: MEDICARE

## 2023-12-14 DIAGNOSIS — Z96.652 STATUS POST LEFT KNEE REPLACEMENT: Primary | ICD-10-CM

## 2023-12-14 PROCEDURE — 1125F AMNT PAIN NOTED PAIN PRSNT: CPT | Performed by: ORTHOPAEDIC SURGERY

## 2023-12-14 PROCEDURE — 1036F TOBACCO NON-USER: CPT | Performed by: ORTHOPAEDIC SURGERY

## 2023-12-14 PROCEDURE — 1159F MED LIST DOCD IN RCRD: CPT | Performed by: ORTHOPAEDIC SURGERY

## 2023-12-14 PROCEDURE — 1160F RVW MEDS BY RX/DR IN RCRD: CPT | Performed by: ORTHOPAEDIC SURGERY

## 2023-12-14 PROCEDURE — 99024 POSTOP FOLLOW-UP VISIT: CPT | Performed by: ORTHOPAEDIC SURGERY

## 2023-12-19 ENCOUNTER — APPOINTMENT (OUTPATIENT)
Dept: ORTHOPEDIC SURGERY | Facility: CLINIC | Age: 68
End: 2023-12-19
Payer: MEDICARE

## 2023-12-19 DIAGNOSIS — M17.0 BILATERAL PRIMARY OSTEOARTHRITIS OF KNEE: ICD-10-CM

## 2023-12-19 RX ORDER — IBUPROFEN 800 MG/1
800 TABLET ORAL
Qty: 90 TABLET | Refills: 1 | Status: SHIPPED | OUTPATIENT
Start: 2023-12-19 | End: 2024-02-26

## 2024-01-29 DIAGNOSIS — Z96.652 STATUS POST TOTAL LEFT KNEE REPLACEMENT: Primary | ICD-10-CM

## 2024-01-30 ENCOUNTER — HOSPITAL ENCOUNTER (OUTPATIENT)
Dept: RADIOLOGY | Facility: CLINIC | Age: 69
Discharge: HOME | End: 2024-01-30
Payer: MEDICARE

## 2024-01-30 DIAGNOSIS — Z96.652 STATUS POST TOTAL LEFT KNEE REPLACEMENT: ICD-10-CM

## 2024-01-30 PROCEDURE — 73564 X-RAY EXAM KNEE 4 OR MORE: CPT | Mod: LT

## 2024-01-30 PROCEDURE — 73564 X-RAY EXAM KNEE 4 OR MORE: CPT | Mod: LEFT SIDE | Performed by: STUDENT IN AN ORGANIZED HEALTH CARE EDUCATION/TRAINING PROGRAM

## 2024-01-31 ENCOUNTER — OFFICE VISIT (OUTPATIENT)
Dept: ORTHOPEDIC SURGERY | Facility: CLINIC | Age: 69
End: 2024-01-31
Payer: MEDICARE

## 2024-01-31 DIAGNOSIS — S83.419A SPRAIN OF MEDIAL COLLATERAL LIGAMENT OF KNEE, INITIAL ENCOUNTER: ICD-10-CM

## 2024-01-31 DIAGNOSIS — Z96.652 HISTORY OF TOTAL KNEE ARTHROPLASTY, LEFT: Primary | ICD-10-CM

## 2024-01-31 PROCEDURE — 1036F TOBACCO NON-USER: CPT

## 2024-01-31 PROCEDURE — 1159F MED LIST DOCD IN RCRD: CPT

## 2024-01-31 PROCEDURE — 1125F AMNT PAIN NOTED PAIN PRSNT: CPT

## 2024-01-31 PROCEDURE — 1160F RVW MEDS BY RX/DR IN RCRD: CPT

## 2024-01-31 PROCEDURE — 99213 OFFICE O/P EST LOW 20 MIN: CPT

## 2024-01-31 PROCEDURE — 1157F ADVNC CARE PLAN IN RCRD: CPT

## 2024-01-31 NOTE — PROGRESS NOTES
LALITHA  Gabby Turner is a 68 y.o. female  in office today for   Chief Complaint   Patient presents with    Left Knee - Post-op, Pain, Edema     10/23 Lt TKA having pain    .  she is a Dr Walker patient.  She states that the knee had been feeling great until about 8 days ago, she was stepping up into her truck when she put all of her weight on the left knee.  She felt a pull across the top of the knee, pain slowly improved until 6 days later when she noticed an area on the inside of her knee that hurt with certain motions.  She has been taking ibuprofen, continues to do her PT once a day.  She also has some radiating pain but attributes that to sciatic nerve pain which she follows with pain management for.  She is wanting to make sure she did not do anything to the repaired knee. Right total knee done 9/2022,        Medication  Current Outpatient Medications on File Prior to Visit   Medication Sig Dispense Refill    albuterol 90 mcg/actuation inhaler Inhale 2 puffs every 4 hours if needed.      benzonatate (Tessalon) 100 mg capsule Take 2 capsules (200 mg) by mouth every 8 hours if needed for cough.      cholecalciferol (Vitamin D-3) 25 MCG (1000 UT) capsule Take 1 capsule (25 mcg) by mouth once daily.      fluticasone (Flonase) 50 mcg/actuation nasal spray Administer 2 sprays into each nostril once daily.      fluticasone (Flonase) 50 mcg/actuation nasal spray 2 sprays by Does not apply route every 12 hours if needed.      ibuprofen 800 mg tablet TAKE 1 TABLET BY MOUTH THREE TIMES A DAY AFTER MEALS 90 tablet 1    montelukast (Singulair) 10 mg tablet Take 1 tablet (10 mg) by mouth once daily at bedtime.      omeprazole (PriLOSEC) 40 mg DR capsule Take 1 capsule (40 mg) by mouth once daily in the morning. Take before meals.      triamterene-hydrochlorothiazid (Maxzide-25) 37.5-25 mg tablet Take 1 tablet by mouth twice a day.       Current Facility-Administered Medications on File Prior to Visit   Medication Dose  Route Frequency Provider Last Rate Last Admin    aspirin EC tablet 81 mg  81 mg oral BID Kiran Reyes MD           Physical Exam  Constitutional: well developed, well nourished female in no acute distress.  Ambulating with cane in office  Psychiatric: normal mood, appropriate affect  Eyes: sclera anicteric  HENT: normocephalic/atraumatic  CV: regular rate and rhythm   Respiratory: non labored breathing  Integumentary: no rash  Neurological: moves all extremities    Left Knee Exam     Tenderness   The patient is experiencing tenderness in the MCL.    Range of Motion   Extension:  0   Flexion:  110     Tests   Varus: negative Valgus: negative  Patellar apprehension: negative    Other   Erythema: absent  Scars: present  Sensation: normal  Swelling: mild  Effusion: no effusion present    Comments:  Surgical incision well healed without erythema, warmth, indurating.  Negative medial stress laxity, but some tenderness over MCL with medial stress.              Imaging/Lab:  X-rays were taken yesterday which were reviewed by myself and read by radiology and show status post bilateral total knee arthroplasty without evidence of complications.      Assessment  Assessment: MCL sprain, post left total knee arthroplasty    Plan  Plan:  History, physical exam, and imaging were reviewed with patient. Do not see any issues with the hardware in the knee, no signs of infection.  Point tenderness over insertion of MCL likely mild MCL sprain.  Discussed conservative treatment, RICE, antiinflammatories, continue with home exercises to her tolerance.  If pain continues can get back into formal PT if needed.  Follow Up: Patient to follow up as needed if pain persists or gets worse.      All questions were answered for the patient prior to end of exam and patient addressed their understanding.    Jacqueline Simms PA-C  01/31/24

## 2024-02-26 DIAGNOSIS — M17.0 BILATERAL PRIMARY OSTEOARTHRITIS OF KNEE: ICD-10-CM

## 2024-02-26 RX ORDER — IBUPROFEN 800 MG/1
800 TABLET ORAL
Qty: 90 TABLET | Refills: 1 | Status: SHIPPED | OUTPATIENT
Start: 2024-02-26 | End: 2024-04-16 | Stop reason: SDUPTHER

## 2024-04-16 ENCOUNTER — HOSPITAL ENCOUNTER (OUTPATIENT)
Dept: RADIOLOGY | Facility: HOSPITAL | Age: 69
Discharge: HOME | End: 2024-04-16
Payer: MEDICARE

## 2024-04-16 ENCOUNTER — OFFICE VISIT (OUTPATIENT)
Dept: ORTHOPEDIC SURGERY | Facility: CLINIC | Age: 69
End: 2024-04-16
Payer: MEDICARE

## 2024-04-16 DIAGNOSIS — M17.11 ARTHRITIS OF RIGHT KNEE: ICD-10-CM

## 2024-04-16 DIAGNOSIS — M17.0 BILATERAL PRIMARY OSTEOARTHRITIS OF KNEE: ICD-10-CM

## 2024-04-16 PROCEDURE — 1160F RVW MEDS BY RX/DR IN RCRD: CPT | Performed by: ORTHOPAEDIC SURGERY

## 2024-04-16 PROCEDURE — 73564 X-RAY EXAM KNEE 4 OR MORE: CPT | Mod: RIGHT SIDE | Performed by: STUDENT IN AN ORGANIZED HEALTH CARE EDUCATION/TRAINING PROGRAM

## 2024-04-16 PROCEDURE — 99213 OFFICE O/P EST LOW 20 MIN: CPT | Performed by: ORTHOPAEDIC SURGERY

## 2024-04-16 PROCEDURE — 73564 X-RAY EXAM KNEE 4 OR MORE: CPT | Mod: RT

## 2024-04-16 PROCEDURE — 1157F ADVNC CARE PLAN IN RCRD: CPT | Performed by: ORTHOPAEDIC SURGERY

## 2024-04-16 PROCEDURE — 1159F MED LIST DOCD IN RCRD: CPT | Performed by: ORTHOPAEDIC SURGERY

## 2024-04-16 PROCEDURE — 1036F TOBACCO NON-USER: CPT | Performed by: ORTHOPAEDIC SURGERY

## 2024-04-16 RX ORDER — IBUPROFEN 800 MG/1
800 TABLET ORAL
Qty: 90 TABLET | Refills: 1 | Status: SHIPPED | OUTPATIENT
Start: 2024-04-16

## 2024-04-16 RX ORDER — GABAPENTIN 300 MG/1
300 CAPSULE ORAL 3 TIMES DAILY
COMMUNITY

## 2024-04-16 NOTE — LETTER
April 16, 2024     Celia Esquivel MD  [address] [facility] [address]    Patient: Gabby Turner   YOB: 1955   Date of Visit: 4/16/2024       Dear Dr. Celia Esquivel MD:    Thank you for referring Gabby Turner to me for evaluation. Below are my notes for this consultation.  If you have questions, please do not hesitate to call me. I look forward to following your patient along with you.       Sincerely,     Fortunato Maravilla MD      CC: No Recipients  ______________________________________________________________________________________    This is a consultation from Dr. Celia Esquivel MD for   Chief Complaint   Patient presents with   • Right Knee - Pain       This is a 68 y.o. female who presents for evaluation of her bilateral knees.  Patient is staged bilateral total knee arthroplasty, the right was done in 2022 the left was done about 6 months ago.  Overall she is progressing well however she notes that she has some pain over the anterior knee on the left side.  She still some weakness in that knee.  It has been gradually progressing over the last several months.  No drainage from incisions no fevers no chills.  She is getting around fairly well not having any the pain she had for surgery    Physical Exam    There has been no interval change in this patient's past medical, surgical, medications, allergies, family history or social history since the most recent visit to a provider within our department. 14 point review of systems was performed, reviewed, and negative except for pertinent positives documented in the history of present illness.     Constitutional: well developed, well nourished female in no acute distress  Psychiatric: normal mood, appropriate affect  Eyes: sclera anicteric  HENT: normocephalic/atraumatic  CV: regular rate and rhythm   Respiratory: non labored breathing  Integumentary:  "no rash  Neurological: moves all extremities    Bilateral knee examination: Well-healed surgical incision erythema no drainage no pain with range of motion neurovascular tact distally    Xrays were ordered by me, they were reviewed and independently interpreted by me today, they show stable bilateral total knee arthroplasties no fracture dislocation or loosening    Procedures      Impression/Plan: This is a 68 y.o. female status post bilateral total knees doing well.  Weightbearing as tolerated activity as tolerated see her back for routine radiographic follow-up    BMI Readings from Last 1 Encounters:   10/20/23 44.79 kg/m²      Lab Results   Component Value Date    CREATININE 0.90 10/05/2023     Tobacco Use: Low Risk  (4/16/2024)    Patient History    • Smoking Tobacco Use: Never    • Smokeless Tobacco Use: Never    • Passive Exposure: Not on file      Computed MELD 3.0 unavailable. One or more values for this score either were not found within the given timeframe or did not fit some other criterion.  Computed MELD-Na unavailable. One or more values for this score either were not found within the given timeframe or did not fit some other criterion.       Lab Results   Component Value Date    HGBA1C 6.0 09/21/2023     No results found for: \"STAPHMRSASCR\"  "

## 2024-04-16 NOTE — PROGRESS NOTES
This is a consultation from Dr. Celia Esquivel MD for   Chief Complaint   Patient presents with    Right Knee - Pain       This is a 68 y.o. female who presents for evaluation of her bilateral knees.  Patient is staged bilateral total knee arthroplasty, the right was done in 2022 the left was done about 6 months ago.  Overall she is progressing well however she notes that she has some pain over the anterior knee on the left side.  She still some weakness in that knee.  It has been gradually progressing over the last several months.  No drainage from incisions no fevers no chills.  She is getting around fairly well not having any the pain she had for surgery    Physical Exam    There has been no interval change in this patient's past medical, surgical, medications, allergies, family history or social history since the most recent visit to a provider within our department. 14 point review of systems was performed, reviewed, and negative except for pertinent positives documented in the history of present illness.     Constitutional: well developed, well nourished female in no acute distress  Psychiatric: normal mood, appropriate affect  Eyes: sclera anicteric  HENT: normocephalic/atraumatic  CV: regular rate and rhythm   Respiratory: non labored breathing  Integumentary: no rash  Neurological: moves all extremities    Bilateral knee examination: Well-healed surgical incision erythema no drainage no pain with range of motion neurovascular tact distally    Xrays were ordered by me, they were reviewed and independently interpreted by me today, they show stable bilateral total knee arthroplasties no fracture dislocation or loosening    Procedures      Impression/Plan: This is a 68 y.o. female status post bilateral total knees doing well.  Weightbearing as tolerated activity as tolerated see her back for routine radiographic follow-up    BMI Readings from Last 1 Encounters:   10/20/23 44.79 kg/m²      Lab Results  "  Component Value Date    CREATININE 0.90 10/05/2023     Tobacco Use: Low Risk  (4/16/2024)    Patient History     Smoking Tobacco Use: Never     Smokeless Tobacco Use: Never     Passive Exposure: Not on file      Computed MELD 3.0 unavailable. One or more values for this score either were not found within the given timeframe or did not fit some other criterion.  Computed MELD-Na unavailable. One or more values for this score either were not found within the given timeframe or did not fit some other criterion.       Lab Results   Component Value Date    HGBA1C 6.0 09/21/2023     No results found for: \"STAPHMRSASCR\"  "

## 2024-06-12 DIAGNOSIS — M17.0 BILATERAL PRIMARY OSTEOARTHRITIS OF KNEE: ICD-10-CM

## 2024-06-14 RX ORDER — IBUPROFEN 800 MG/1
800 TABLET ORAL
Qty: 90 TABLET | Refills: 1 | Status: SHIPPED | OUTPATIENT
Start: 2024-06-14

## 2024-07-19 ENCOUNTER — APPOINTMENT (OUTPATIENT)
Dept: ORTHOPEDIC SURGERY | Facility: CLINIC | Age: 69
End: 2024-07-19
Payer: MEDICARE

## 2024-07-19 VITALS — HEIGHT: 59 IN | WEIGHT: 218 LBS | BODY MASS INDEX: 43.95 KG/M2

## 2024-07-19 DIAGNOSIS — M43.10 ACQUIRED SPONDYLOLISTHESIS: ICD-10-CM

## 2024-07-19 DIAGNOSIS — M48.062 LUMBAR STENOSIS WITH NEUROGENIC CLAUDICATION: Primary | ICD-10-CM

## 2024-07-19 PROCEDURE — 3008F BODY MASS INDEX DOCD: CPT | Performed by: ORTHOPAEDIC SURGERY

## 2024-07-19 PROCEDURE — 1157F ADVNC CARE PLAN IN RCRD: CPT | Performed by: ORTHOPAEDIC SURGERY

## 2024-07-19 PROCEDURE — 1159F MED LIST DOCD IN RCRD: CPT | Performed by: ORTHOPAEDIC SURGERY

## 2024-07-19 PROCEDURE — 99205 OFFICE O/P NEW HI 60 MIN: CPT | Performed by: ORTHOPAEDIC SURGERY

## 2024-07-19 ASSESSMENT — PAIN - FUNCTIONAL ASSESSMENT: PAIN_FUNCTIONAL_ASSESSMENT: 0-10

## 2024-07-19 NOTE — LETTER
July 22, 2024     Celia Esquivel MD  [address] [facility] [address]    Patient: Gabby Turner   YOB: 1955   Date of Visit: 7/19/2024       Dear Dr. Celia Esquivel MD:    Thank you for referring Gabby Turner to me for evaluation. Below are my notes for this consultation.  If you have questions, please do not hesitate to call me. I look forward to following your patient along with you.       Sincerely,     Connor Means MD      CC: No Recipients  ______________________________________________________________________________________    HPI:Gabby Turner is a 68-year-old woman, comes in today for second opinion.  Patient struggles with back pain and leg pain left greater than right.  She describes claudication-like symptoms.  She has received treatment at the [facility].  She has had some steroid injections which have been helpful for short period of time.  She is also done physical therapy chiropractic treatment and she is currently working on weight management.  Patient was told the [facility], that she needed to reach a BMI of 35 before surgery would be indicated.      ROS:  Reviewed on EMR and patient intake sheet.    PMH/SH:  Reviewed on EMR and patient intake sheet.    Exam:  Physical Exam    Constitutional: Well appearing; no acute distress  Eyes: pupils are equal and round  Psych: normal affect  Respiratory: non-labored breathing  Cardiovascular: regular rate and rhythm  GI: non-distended abdomen  Musculoskeletal: no pain with range of motion of the hips bilaterally  Neurologic: [4+]/5 strength in the lower extremities bilaterally]; [negative] straight leg raise    Radiology:     MRI from the [facility] was personally reviewed.  Patient has moderately severe spinal stenosis at L4-5 in conjunction with a spondylolisthesis    Diagnosis:    Lumbar stenosis; degenerative  spondylolisthesis    Assessment and Plan:   68-year-old woman, symptomatic neurogenic claudication secondary spinal stenosis and degenerative spondylolisthesis.  She has failed nonoperative management including physical therapy chiropractic treatment and steroid injections.  Surgical intervention would be the neck step.  We discussed surgery today including a L4-5 decompression and fusion procedure.    We discussed surgery today at length, including the specifics of the actual proposed procedure, the projected hospital course and post-operative recovery or rehabilitation, and the expected results of this surgery.  The potential benefits and risks of the proposed surgery include, but are not limited to those of infection, spinal fluid leaks, nerve injury or paralysis, whether that be complete or partial paralysis, foot drop, instrumentation failure, non-union or latent instability, future adjacent segment disease, epidural hematoma, wound dehiscence, reherniation, persistent pain or paresthesias, and the general risks of anaesthesia including, but not limited to those of stroke, heart attack, respiratory difficulties and death.  The patient understands that there are no guarantees in regard to outcome or potential complications associated with the proposed procedure.  After this discussion, the patient articulated understanding of the topics covered and felt that all questions had been covered and answered satisfactorily.    We further discussed the importance of weight reduction to minimize any potential perioperative complications.  Patient appears to understand that and is working actively with weight reduction at the [facility].  She was appreciative for the second opinion and will follow-up as needed.    The patient was in agreement with the plan. At the end of the visit today, the patient felt that all questions had been answered satisfactorily.  The patient was pleased with the visit and very  appreciative for the care rendered.     Thank you very much for the kind referral.  It is a privilege, and a pleasure, to partner with you in the care of your patients.  I would be delighted to assist you with any further consultations as needed.          Connor Means MD    Chief of Spine Surgery, [place]  Director of Spine Service, [place]  , Department of Orthopaedics  [facility] [employer] [facility]  [address] [id number]  P: [phone]  [email or web address]    This note was dictated with voice recognition software.  It has not been proofread for grammatical errors, typographical mistakes or other semantic inconsistencies.

## 2024-07-22 NOTE — PROGRESS NOTES
HPI:Gabby Turner is a 68-year-old woman, comes in today for second opinion.  Patient struggles with back pain and leg pain left greater than right.  She describes claudication-like symptoms.  She has received treatment at the [facility].  She has had some steroid injections which have been helpful for short period of time.  She is also done physical therapy chiropractic treatment and she is currently working on weight management.  Patient was told the [facility], that she needed to reach a BMI of 35 before surgery would be indicated.      ROS:  Reviewed on EMR and patient intake sheet.    PMH/SH:  Reviewed on EMR and patient intake sheet.    Exam:  Physical Exam    Constitutional: Well appearing; no acute distress  Eyes: pupils are equal and round  Psych: normal affect  Respiratory: non-labored breathing  Cardiovascular: regular rate and rhythm  GI: non-distended abdomen  Musculoskeletal: no pain with range of motion of the hips bilaterally  Neurologic: [4+]/5 strength in the lower extremities bilaterally]; [negative] straight leg raise    Radiology:     MRI from the [facility] was personally reviewed.  Patient has moderately severe spinal stenosis at L4-5 in conjunction with a spondylolisthesis    Diagnosis:    Lumbar stenosis; degenerative spondylolisthesis    Assessment and Plan:   68-year-old woman, symptomatic neurogenic claudication secondary spinal stenosis and degenerative spondylolisthesis.  She has failed nonoperative management including physical therapy chiropractic treatment and steroid injections.  Surgical intervention would be the neck step.  We discussed surgery today including a L4-5 decompression and fusion procedure.    We discussed surgery today at length, including the specifics of the actual proposed procedure, the projected hospital course and post-operative recovery or rehabilitation, and the expected results of this surgery.  The potential benefits and risks of the  proposed surgery include, but are not limited to those of infection, spinal fluid leaks, nerve injury or paralysis, whether that be complete or partial paralysis, foot drop, instrumentation failure, non-union or latent instability, future adjacent segment disease, epidural hematoma, wound dehiscence, reherniation, persistent pain or paresthesias, and the general risks of anaesthesia including, but not limited to those of stroke, heart attack, respiratory difficulties and death.  The patient understands that there are no guarantees in regard to outcome or potential complications associated with the proposed procedure.  After this discussion, the patient articulated understanding of the topics covered and felt that all questions had been covered and answered satisfactorily.    We further discussed the importance of weight reduction to minimize any potential perioperative complications.  Patient appears to understand that and is working actively with weight reduction at the [facility].  She was appreciative for the second opinion and will follow-up as needed.    The patient was in agreement with the plan. At the end of the visit today, the patient felt that all questions had been answered satisfactorily.  The patient was pleased with the visit and very appreciative for the care rendered.     Thank you very much for the kind referral.  It is a privilege, and a pleasure, to partner with you in the care of your patients.  I would be delighted to assist you with any further consultations as needed.          Connor Means MD    Chief of Spine Surgery, [facility]  Director of Spine Service, [facility]  , Department of Orthopaedics  [facility]  [address] [place] [address]  P: [phone]  [facility]    This note was dictated with voice recognition software.  It has not been  proofread for grammatical errors, typographical mistakes or other semantic inconsistencies.

## 2024-07-25 ENCOUNTER — HOSPITAL ENCOUNTER (OUTPATIENT)
Dept: RADIOLOGY | Facility: EXTERNAL LOCATION | Age: 69
Discharge: HOME | End: 2024-07-25

## 2024-08-11 DIAGNOSIS — M17.0 BILATERAL PRIMARY OSTEOARTHRITIS OF KNEE: ICD-10-CM

## 2024-08-12 RX ORDER — IBUPROFEN 800 MG/1
800 TABLET ORAL
Qty: 90 TABLET | Refills: 1 | Status: SHIPPED | OUTPATIENT
Start: 2024-08-12

## 2024-09-13 DIAGNOSIS — M17.0 BILATERAL PRIMARY OSTEOARTHRITIS OF KNEE: ICD-10-CM

## 2024-09-16 RX ORDER — IBUPROFEN 800 MG/1
800 TABLET ORAL
Qty: 90 TABLET | Refills: 1 | Status: SHIPPED | OUTPATIENT
Start: 2024-09-16

## 2024-11-27 DIAGNOSIS — M17.0 BILATERAL PRIMARY OSTEOARTHRITIS OF KNEE: ICD-10-CM

## 2024-12-01 RX ORDER — IBUPROFEN 800 MG/1
800 TABLET ORAL
Qty: 90 TABLET | Refills: 1 | Status: SHIPPED | OUTPATIENT
Start: 2024-12-01

## 2025-01-24 ENCOUNTER — HOSPITAL ENCOUNTER (OUTPATIENT)
Dept: RADIOLOGY | Facility: CLINIC | Age: 70
Discharge: HOME | End: 2025-01-24
Payer: MEDICARE

## 2025-01-24 ENCOUNTER — APPOINTMENT (OUTPATIENT)
Dept: ORTHOPEDIC SURGERY | Facility: CLINIC | Age: 70
End: 2025-01-24
Payer: MEDICARE

## 2025-01-24 DIAGNOSIS — M54.16 LUMBAR RADICULOPATHY: ICD-10-CM

## 2025-01-24 DIAGNOSIS — M43.10 ACQUIRED SPONDYLOLISTHESIS: ICD-10-CM

## 2025-01-24 DIAGNOSIS — M48.062 LUMBAR STENOSIS WITH NEUROGENIC CLAUDICATION: Primary | ICD-10-CM

## 2025-01-24 PROCEDURE — 1157F ADVNC CARE PLAN IN RCRD: CPT | Performed by: ORTHOPAEDIC SURGERY

## 2025-01-24 PROCEDURE — 72110 X-RAY EXAM L-2 SPINE 4/>VWS: CPT

## 2025-01-24 PROCEDURE — 99215 OFFICE O/P EST HI 40 MIN: CPT | Performed by: ORTHOPAEDIC SURGERY

## 2025-01-24 PROCEDURE — 1159F MED LIST DOCD IN RCRD: CPT | Performed by: ORTHOPAEDIC SURGERY

## 2025-01-24 NOTE — LETTER
January 27, 2025     Celia Esquivel MD  [address] [facility] [address]    Patient: Jessica Turner   YOB: 1955   Date of Visit: 1/24/2025       Dear Dr. Celia Esquivel MD:    Thank you for referring Jessica Turner to me for evaluation. Below are my notes for this consultation.  If you have questions, please do not hesitate to call me. I look forward to following your patient along with you.       Sincerely,     Connor Means MD      CC: No Recipients  ______________________________________________________________________________________    HPI:Gabby Turner is a 69-year-old woman who comes in today for a follow-up.  She has been attempting weight reduction and has lost 35 pounds.  She continues, however, to have left-sided low back pain and leg pain.  She describes claudication-like symptoms.  She has been taking anti-inflammatories.  She has done physical therapy.      ROS:  Reviewed on EMR and patient intake sheet.    PMH/SH:  Reviewed on EMR and patient intake sheet.    Exam:  Physical Exam    Constitutional: Well appearing; no acute distress  Eyes: pupils are equal and round  Psych: normal affect  Respiratory: non-labored breathing  Cardiovascular: regular rate and rhythm  GI: non-distended abdomen  Musculoskeletal: no pain with range of motion of the hips bilaterally  Neurologic: [4+]/5 strength in the lower extremities bilaterally]; [negative] straight leg raise    Radiology:     MRI demonstrates severe spinal stenosis at L4-5 in conjunction with a degenerative spondylolisthesis x-rays today demonstrate a grade 2 spondylolisthesis on flexion films at L4-5    Diagnosis:    Neurogenic claudication secondary spinal stenosis; lumbar radiculopathy; degenerative spondylolisthesis    Assessment and Plan:   69-year-old woman with chronic claudication radicular symptoms secondary spinal stenosis and a degenerative  spondylolisthesis.  She has failed nonoperative management and surgical intervention would be the next step.  We discussed surgery today.  We discussed surgery today at length, including the specifics of the actual proposed procedure, the projected hospital course and post-operative recovery or rehabilitation, and the expected results of this surgery.  The potential benefits and risks of the proposed surgery include, but are not limited to those of infection, spinal fluid leaks, nerve injury or paralysis, whether that be complete or partial paralysis, foot drop, instrumentation failure, non-union or latent instability, future adjacent segment disease, epidural hematoma, wound dehiscence, reherniation, persistent pain or paresthesias, and the general risks of anaesthesia including, but not limited to those of stroke, heart attack, respiratory difficulties and death.  The patient understands that there are no guarantees in regard to outcome or potential complications associated with the proposed procedure.  After this discussion, the patient articulated understanding of the topics covered and felt that all questions had been covered and answered satisfactorily.    Prior to surgical intervention, we set as a goal BMI of 35.  Patient is doing her best to achieve that with the hopes of minimizing perioperative complications.  She will be in contact with me when she has had further weight reduction.    The patient was in agreement with the plan. At the end of the visit today, the patient felt that all questions had been answered satisfactorily.  The patient was pleased with the visit and very appreciative for the care rendered.     Thank you very much for the kind referral.  It is a privilege, and a pleasure, to partner with you in the care of your patients.  I would be delighted to assist you with any further consultations as needed.          Connor Means MD    Chief of Spine Surgery, [facility]  Director of Spine Service, [facility]  , Department of Orthopaedics  [employer] [facility] [employer]  [address] [place] [address]  P: [phone]  [email or web address]    This note was dictated with voice recognition software.  It has not been proofread for grammatical errors, typographical mistakes or other semantic inconsistencies.

## 2025-01-25 DIAGNOSIS — M17.0 BILATERAL PRIMARY OSTEOARTHRITIS OF KNEE: ICD-10-CM

## 2025-01-27 RX ORDER — IBUPROFEN 800 MG/1
800 TABLET ORAL
Qty: 90 TABLET | Refills: 1 | Status: SHIPPED | OUTPATIENT
Start: 2025-01-27

## 2025-01-27 NOTE — PROGRESS NOTES
HPI:Gabby Turner is a 69-year-old woman who comes in today for a follow-up.  She has been attempting weight reduction and has lost 35 pounds.  She continues, however, to have left-sided low back pain and leg pain.  She describes claudication-like symptoms.  She has been taking anti-inflammatories.  She has done physical therapy.      ROS:  Reviewed on EMR and patient intake sheet.    PMH/SH:  Reviewed on EMR and patient intake sheet.    Exam:  Physical Exam    Constitutional: Well appearing; no acute distress  Eyes: pupils are equal and round  Psych: normal affect  Respiratory: non-labored breathing  Cardiovascular: regular rate and rhythm  GI: non-distended abdomen  Musculoskeletal: no pain with range of motion of the hips bilaterally  Neurologic: [4+]/5 strength in the lower extremities bilaterally]; [negative] straight leg raise    Radiology:     MRI demonstrates severe spinal stenosis at L4-5 in conjunction with a degenerative spondylolisthesis x-rays today demonstrate a grade 2 spondylolisthesis on flexion films at L4-5    Diagnosis:    Neurogenic claudication secondary spinal stenosis; lumbar radiculopathy; degenerative spondylolisthesis    Assessment and Plan:   69-year-old woman with chronic claudication radicular symptoms secondary spinal stenosis and a degenerative spondylolisthesis.  She has failed nonoperative management and surgical intervention would be the next step.  We discussed surgery today.  We discussed surgery today at length, including the specifics of the actual proposed procedure, the projected hospital course and post-operative recovery or rehabilitation, and the expected results of this surgery.  The potential benefits and risks of the proposed surgery include, but are not limited to those of infection, spinal fluid leaks, nerve injury or paralysis, whether that be complete or partial paralysis, foot drop, instrumentation failure, non-union or latent instability, future adjacent  segment disease, epidural hematoma, wound dehiscence, reherniation, persistent pain or paresthesias, and the general risks of anaesthesia including, but not limited to those of stroke, heart attack, respiratory difficulties and death.  The patient understands that there are no guarantees in regard to outcome or potential complications associated with the proposed procedure.  After this discussion, the patient articulated understanding of the topics covered and felt that all questions had been covered and answered satisfactorily.    Prior to surgical intervention, we set as a goal BMI of 35.  Patient is doing her best to achieve that with the hopes of minimizing perioperative complications.  She will be in contact with me when she has had further weight reduction.    The patient was in agreement with the plan. At the end of the visit today, the patient felt that all questions had been answered satisfactorily.  The patient was pleased with the visit and very appreciative for the care rendered.     Thank you very much for the kind referral.  It is a privilege, and a pleasure, to partner with you in the care of your patients.  I would be delighted to assist you with any further consultations as needed.          Connor Means MD    Chief of Spine Surgery, [facility]  Director of Spine Service, [facility]  , Department of Orthopaedics  [facility] [employer]  [id number] [place] [address]  P: [phone]  [facility]    This note was dictated with voice recognition software.  It has not been proofread for grammatical errors, typographical mistakes or other semantic inconsistencies.

## 2025-03-13 DIAGNOSIS — M17.0 BILATERAL PRIMARY OSTEOARTHRITIS OF KNEE: ICD-10-CM

## 2025-03-13 RX ORDER — IBUPROFEN 800 MG/1
800 TABLET ORAL
Qty: 90 TABLET | Refills: 1 | Status: SHIPPED | OUTPATIENT
Start: 2025-03-13

## 2025-04-08 DIAGNOSIS — M54.16 LUMBAR RADICULOPATHY: ICD-10-CM

## 2025-04-08 DIAGNOSIS — R79.1 ABNORMAL COAGULATION PROFILE: ICD-10-CM

## 2025-04-08 DIAGNOSIS — R94.5 ABNORMAL RESULTS OF LIVER FUNCTION STUDIES: ICD-10-CM

## 2025-04-26 DIAGNOSIS — M17.0 BILATERAL PRIMARY OSTEOARTHRITIS OF KNEE: ICD-10-CM

## 2025-04-28 RX ORDER — IBUPROFEN 800 MG/1
800 TABLET ORAL
Qty: 90 TABLET | Refills: 1 | Status: SHIPPED | OUTPATIENT
Start: 2025-04-28

## 2025-06-04 ENCOUNTER — TELEPHONE (OUTPATIENT)
Dept: ORTHOPEDIC SURGERY | Facility: HOSPITAL | Age: 70
End: 2025-06-04
Payer: MEDICARE

## 2025-06-04 NOTE — TELEPHONE ENCOUNTER
Spoke to the patient over the telephone about preoperative questions and medications.  We discussed which medication she can take up to the day before surgery.  Also answered a few questions regarding the surgery itself.  All questions answered satisfactorily.    **This note was dictated using speech recognition software and was not corrected for spelling or grammatical errors**    Fady Bravo PA-C  Department of Orthopaedic Surgery  3:31 PM  06/04/25    [id number] [place] [address]    Voicemail: [phone]   Appts: [phone]  Fax: [phone]

## 2025-06-09 ENCOUNTER — TELEPHONE (OUTPATIENT)
Dept: PREADMISSION TESTING | Facility: HOSPITAL | Age: 70
End: 2025-06-09

## 2025-06-18 ENCOUNTER — CLINICAL SUPPORT (OUTPATIENT)
Dept: PREADMISSION TESTING | Facility: HOSPITAL | Age: 70
End: 2025-06-18
Payer: MEDICARE

## 2025-06-18 RX ORDER — METFORMIN HYDROCHLORIDE 500 MG/1
500 TABLET, EXTENDED RELEASE ORAL
COMMUNITY
Start: 2025-05-08

## 2025-06-18 RX ORDER — CALCIUM CARBONATE 300MG(750)
400 TABLET,CHEWABLE ORAL DAILY
COMMUNITY

## 2025-06-18 RX ORDER — TRAMADOL HYDROCHLORIDE 50 MG/1
50 TABLET, FILM COATED ORAL 2 TIMES DAILY
COMMUNITY

## 2025-06-18 RX ORDER — ACETAMINOPHEN 500 MG
1000 TABLET ORAL EVERY 8 HOURS PRN
COMMUNITY
End: 2025-06-19 | Stop reason: WASHOUT

## 2025-06-18 RX ORDER — ASPIRIN 81 MG/1
81 TABLET ORAL DAILY
COMMUNITY

## 2025-06-18 RX ORDER — SEMAGLUTIDE 2.5 MG/ML
VIAL (ML) SUBCUTANEOUS
COMMUNITY

## 2025-06-18 RX ORDER — LISINOPRIL 20 MG/1
20 TABLET ORAL DAILY
COMMUNITY

## 2025-06-18 NOTE — CPM/PAT NURSE NOTE
"CPM/PAT Nurse Note      Name: Gabby Turner (Gabby Turner \"Jessica\")  /Age: 1955/69 y.o.       Medical History[1]    Surgical History[2]    Patient  has no history on file for sexual activity.    Family History[3]    Allergies[4]    Prior to Admission medications    Medication Sig Start Date End Date Taking? Authorizing Provider   metFORMIN  mg 24 hr tablet Take 1 tablet (500 mg) by mouth 2 times daily (morning and late afternoon). 25  Yes Historical Provider, MD   acetaminophen (Tylenol) 500 mg tablet Take 2 tablets (1,000 mg) by mouth every 8 hours if needed for mild pain (1 - 3).    Historical Provider, MD   aspirin 81 mg EC tablet Take 1 tablet (81 mg) by mouth once daily.    Historical Provider, MD   benzonatate (Tessalon) 100 mg capsule Take 2 capsules (200 mg) by mouth every 8 hours if needed for cough. 23   Historical Provider, MD   cholecalciferol (Vitamin D-3) 25 MCG (1000 UT) capsule Take 1 capsule (25 mcg) by mouth once daily. 12   Historical Provider, MD   fluticasone (Flonase) 50 mcg/actuation nasal spray Administer 2 sprays into each nostril once daily. 22   Historical Provider, MD   gabapentin (Neurontin) 300 mg capsule Take 1 capsule (300 mg) by mouth 3 times a day.    Historical Provider, MD   ibuprofen 800 mg tablet TAKE 1 TABLET (800 MG) BY MOUTH 3 TIMES A DAY AFTER MEALS.  Patient taking differently: Take 1 tablet (800 mg) by mouth if needed. 25   Fortunato Maravilla MD   lisinopril 20 mg tablet Take 1 tablet (20 mg) by mouth once daily.    Historical Provider, MD   magnesium oxide (Mag-Ox) 400 mg tablet Take 1 tablet (400 mg) by mouth once daily.    Historical Provider, MD   montelukast (Singulair) 10 mg tablet Take 1 tablet (10 mg) by mouth once daily at bedtime. 21   Historical Provider, MD   omeprazole (PriLOSEC) 40 mg DR capsule Take 1 capsule (40 mg) by mouth once daily in the morning. Take before meals. 22   Historical Provider, MD "   semaglutide 2.5 mg/mL solution Inject under the skin 1 (one) time per week. WEDNESDAY    Historical Provider, MD   traMADol (Ultram) 50 mg tablet Take 1 tablet (50 mg) by mouth 2 times a day.    Historical Provider, MD   albuterol 90 mcg/actuation inhaler Inhale 2 puffs every 4 hours if needed. 4/5/23 6/18/25  Historical Provider, MD   fluticasone (Flonase) 50 mcg/actuation nasal spray 2 sprays by Does not apply route every 12 hours if needed. 9/21/23 6/18/25  Historical Provider, MD   triamterene-hydrochlorothiazid (Maxzide-25) 37.5-25 mg tablet Take 1 tablet by mouth once daily. 8/31/21 6/18/25  Historical Provider, MD HARJINDER BURNETTE     DASI Risk Score      Flowsheet Row Questionnaire Series Submission from 4/28/2025 in [facility] with Generic Provider Jarrett   Can you take care of yourself (eat, dress, bathe, or use toilet)?  2.75  filed at 04/28/2025 1937   Can you walk indoors, such as around your house? 1.75  filed at 04/28/2025 1937   Can you walk a block or two on level ground?  0  filed at 04/28/2025 1937   Can you climb a flight of stairs or walk up a hill? 0  filed at 04/28/2025 1937   Can you run a short distance? 0  filed at 04/28/2025 1937   Can you do light work around the house like dusting or washing dishes? 2.7  filed at 04/28/2025 1937   Can you do moderate work around the house like vacuuming, sweeping floors or carrying groceries? 3.5  filed at 04/28/2025 1937   Can you do heavy work around the house like scrubbing floors or lifting and moving heavy furniture?  0  filed at 04/28/2025 1937   Can you do yard work like raking leaves, weeding or pushing a mower? 0  filed at 04/28/2025 1937   Can you have sexual relations? 5.25  filed at 04/28/2025 1937   Can you participate in moderate recreational activities like golf, bowling, dancing, doubles tennis or throwing a baseball or football? 0  filed at 04/28/2025 1937   Can you participate in strenous sports like swimming, singles tennis,  football, basketball, or skiing? 0  filed at 04/28/2025 1937   DASI SCORE 15.95  filed at 04/28/2025 1937   METS Score (Will be calculated only when all the questions are answered) 4.7  filed at 04/28/2025 1937          Caprini DVT Assessment      Flowsheet Row Admission (Discharged) from 10/4/2023 in [address] with Kiran Reyes MD   DVT Score (IF A SCORE IS NOT CALCULATING, MUST SELECT A BMI TO COMPLETE) 2 filed at 10/03/2023 1651   RETIRED: Current Status Major surgery planned, including arthroscopic and laproscopic (1-2 hours) filed at 10/03/2023 1651          Modified Frailty Index    No data to display       [id number] Stroke Risk Points  Current as of just now        N/A 0 to 9 Points:      Last Change: N/A          The [id number]-RSJw risk score (Lip LIZA, et al. 2009. © 2010 American College [employer]) quantifies the risk of stroke for a patient with atrial fibrillation. For patients without atrial fibrillation or under the age of 18 this score appears as N/A. Higher score values generally indicate higher risk of stroke.        This score is not applicable to this patient. Components are not calculated.          Revised Cardiac Risk Index    No data to display       Apfel Simplified Score    No data to display       Risk Analysis Index Results This Encounter    No data found in the last 10 encounters.       Stop Bang Score      Flowsheet Row Admission (Discharged) from 10/4/2023 in [address] with Kiran Reyes MD   Do you snore loudly? 1 filed at 10/04/2023 0837   Do you often feel tired or fatigued after your sleep? 1 filed at 10/04/2023 0837   Has anyone ever observed you stop breathing in your sleep? 1 filed at 10/04/2023 0837   Do you have or are you being treated for high blood pressure? 1 filed at 10/04/2023 0837   Recent BMI (Calculated) 46.9 filed at 10/04/2023 0837   Is BMI greater than 35 kg/m2? 1=Yes filed at 10/04/2023 0837   Age older  than 50 years old? 1=Yes filed at 10/04/2023 0837   Is your neck circumference greater than 17 inches (Male) or 16 inches (Female)? 1 filed at 10/04/2023 0837          Prodigy: High Risk  Total Score: 15              Prodigy Age Score      Prodigy Previous Opioid Use Score           ARISCAT Score for Postoperative Pulmonary Complications    No data to display       Stewart Perioperative Risk for Myocardial Infarction or Cardiac Arrest (CHIDI)    No data to display         Nurse Plan of Action:     RN screening call complete.  Reviewed allergies, medications and pharmacy, medical, surgical and social history with patient.  Chart updated.  Instructed patient to stop ibuprofen now prior to surgery.              [1]   Past Medical History:  Diagnosis Date    Arthritis     CKD (chronic kidney disease)     creat 0.9, BUN 22, GFR 68 - 25    GERD (gastroesophageal reflux disease)     HL (hearing loss)     Hyperparathyroidism (Multi)     following with endo - Ca 10.4- 25 - pt was instructed to stop ca supplement and recheck level in Aug    Hypertension     Hypomagnesemia     started on oral supplement - 1.2 on 25    Prediabetes     24: A1C 6.2%    Sleep apnea     BiPAP    Spinal stenosis, lumbar region with neurogenic claudication    [2]   Past Surgical History:  Procedure Laterality Date    BUNIONECTOMY Left     CARPAL TUNNEL RELEASE Bilateral     CATARACT EXTRACTION W/  INTRAOCULAR LENS IMPLANT Bilateral      SECTION, CLASSIC      x 2    CHOLECYSTECTOMY      COLONOSCOPY      TONSILLECTOMY N/A     TOTAL KNEE ARTHROPLASTY Right     TOTAL KNEE ARTHROPLASTY Left    [3]   Family History  Problem Relation Name Age of Onset    Transient ischemic attack Mother      Other (carotid artery stenosis) Mother      Diabetes Mother      Cancer Father Reji Becerra     Heart attack Father Reji Becerra     Breast cancer Sister      Diabetes Brother     [4]   Allergies  Allergen Reactions    Sulfa  (Sulfonamide Antibiotics) Rash

## 2025-06-19 ENCOUNTER — LAB (OUTPATIENT)
Dept: LAB | Facility: HOSPITAL | Age: 70
End: 2025-06-19
Payer: MEDICARE

## 2025-06-19 ENCOUNTER — PRE-ADMISSION TESTING (OUTPATIENT)
Dept: PREADMISSION TESTING | Facility: HOSPITAL | Age: 70
End: 2025-06-19
Payer: MEDICARE

## 2025-06-19 VITALS
RESPIRATION RATE: 16 BRPM | SYSTOLIC BLOOD PRESSURE: 106 MMHG | TEMPERATURE: 96.7 F | WEIGHT: 187.39 LBS | DIASTOLIC BLOOD PRESSURE: 58 MMHG | OXYGEN SATURATION: 98 % | HEART RATE: 83 BPM | BODY MASS INDEX: 37.78 KG/M2 | HEIGHT: 59 IN

## 2025-06-19 DIAGNOSIS — Z01.818 ENCOUNTER FOR OTHER PREPROCEDURAL EXAMINATION: Primary | ICD-10-CM

## 2025-06-19 DIAGNOSIS — R73.9 ELEVATED BLOOD SUGAR: ICD-10-CM

## 2025-06-19 DIAGNOSIS — R06.02 SOB (SHORTNESS OF BREATH) ON EXERTION: ICD-10-CM

## 2025-06-19 DIAGNOSIS — R73.9 HYPERGLYCEMIA, UNSPECIFIED: ICD-10-CM

## 2025-06-19 DIAGNOSIS — Z01.818 PREOP TESTING: Primary | ICD-10-CM

## 2025-06-19 DIAGNOSIS — R06.02 SHORTNESS OF BREATH: ICD-10-CM

## 2025-06-19 LAB
ABO GROUP (TYPE) IN BLOOD: NORMAL
ANION GAP SERPL CALC-SCNC: 16 MMOL/L (ref 10–20)
ANTIBODY SCREEN: NORMAL
APTT PPP: 35 SECONDS (ref 26–36)
BASOPHILS # BLD AUTO: 0.03 X10*3/UL (ref 0–0.1)
BASOPHILS NFR BLD AUTO: 0.3 %
BUN SERPL-MCNC: 18 MG/DL (ref 6–23)
CALCIUM SERPL-MCNC: 10.3 MG/DL (ref 8.6–10.3)
CHLORIDE SERPL-SCNC: 103 MMOL/L (ref 98–107)
CO2 SERPL-SCNC: 22 MMOL/L (ref 21–32)
CREAT SERPL-MCNC: 0.87 MG/DL (ref 0.5–1.05)
EGFRCR SERPLBLD CKD-EPI 2021: 71 ML/MIN/1.73M*2
EOSINOPHIL # BLD AUTO: 0.11 X10*3/UL (ref 0–0.4)
EOSINOPHIL NFR BLD AUTO: 1.3 %
ERYTHROCYTE [DISTWIDTH] IN BLOOD BY AUTOMATED COUNT: 13.7 % (ref 11.5–14.5)
EST. AVERAGE GLUCOSE BLD GHB EST-MCNC: 103 MG/DL
GLUCOSE SERPL-MCNC: 80 MG/DL (ref 74–99)
HBA1C MFR BLD: 5.2 % (ref ?–5.7)
HCT VFR BLD AUTO: 41.9 % (ref 36–46)
HGB BLD-MCNC: 13.3 G/DL (ref 12–16)
IMM GRANULOCYTES # BLD AUTO: 0.05 X10*3/UL (ref 0–0.5)
IMM GRANULOCYTES NFR BLD AUTO: 0.6 % (ref 0–0.9)
INR PPP: 1 (ref 0.9–1.1)
LYMPHOCYTES # BLD AUTO: 1.4 X10*3/UL (ref 0.8–3)
LYMPHOCYTES NFR BLD AUTO: 16.1 %
MCH RBC QN AUTO: 30.6 PG (ref 26–34)
MCHC RBC AUTO-ENTMCNC: 31.7 G/DL (ref 32–36)
MCV RBC AUTO: 97 FL (ref 80–100)
MONOCYTES # BLD AUTO: 0.68 X10*3/UL (ref 0.05–0.8)
MONOCYTES NFR BLD AUTO: 7.8 %
NEUTROPHILS # BLD AUTO: 6.43 X10*3/UL (ref 1.6–5.5)
NEUTROPHILS NFR BLD AUTO: 73.9 %
NRBC BLD-RTO: 0 /100 WBCS (ref 0–0)
PLATELET # BLD AUTO: 251 X10*3/UL (ref 150–450)
POTASSIUM SERPL-SCNC: 4.3 MMOL/L (ref 3.5–5.3)
PROTHROMBIN TIME: 10.6 SECONDS (ref 9.8–12.4)
RBC # BLD AUTO: 4.34 X10*6/UL (ref 4–5.2)
RH FACTOR (ANTIGEN D): NORMAL
SODIUM SERPL-SCNC: 137 MMOL/L (ref 136–145)
WBC # BLD AUTO: 8.7 X10*3/UL (ref 4.4–11.3)

## 2025-06-19 PROCEDURE — 87081 CULTURE SCREEN ONLY: CPT | Mod: AHULAB | Performed by: PHYSICIAN ASSISTANT

## 2025-06-19 PROCEDURE — 83036 HEMOGLOBIN GLYCOSYLATED A1C: CPT

## 2025-06-19 PROCEDURE — 85025 COMPLETE CBC W/AUTO DIFF WBC: CPT

## 2025-06-19 PROCEDURE — 93010 ELECTROCARDIOGRAM REPORT: CPT | Performed by: INTERNAL MEDICINE

## 2025-06-19 PROCEDURE — 85610 PROTHROMBIN TIME: CPT

## 2025-06-19 PROCEDURE — 80048 BASIC METABOLIC PNL TOTAL CA: CPT

## 2025-06-19 PROCEDURE — 86900 BLOOD TYPING SEROLOGIC ABO: CPT

## 2025-06-19 PROCEDURE — 86901 BLOOD TYPING SEROLOGIC RH(D): CPT

## 2025-06-19 PROCEDURE — 86850 RBC ANTIBODY SCREEN: CPT

## 2025-06-19 PROCEDURE — 85730 THROMBOPLASTIN TIME PARTIAL: CPT

## 2025-06-19 RX ORDER — CHLORHEXIDINE GLUCONATE ORAL RINSE 1.2 MG/ML
SOLUTION DENTAL
Qty: 475 ML | Refills: 0 | Status: ON HOLD | OUTPATIENT
Start: 2025-06-19 | End: 2025-06-25 | Stop reason: ALTCHOICE

## 2025-06-19 RX ORDER — DEXTROMETHORPHAN HYDROBROMIDE, GUAIFENESIN 5; 100 MG/5ML; MG/5ML
650 LIQUID ORAL EVERY 8 HOURS PRN
COMMUNITY
End: 2025-06-26 | Stop reason: HOSPADM

## 2025-06-19 ASSESSMENT — ENCOUNTER SYMPTOMS
DIFFICULTY URINATING: 0
BRUISES/BLEEDS EASILY: 1
RHINORRHEA: 0
SKIN CHANGES: 0
NAUSEA: 0
WHEEZING: 0
UNEXPECTED WEIGHT CHANGE: 0
PALPITATIONS: 0
WEAKNESS: 0
SINUS CONGESTION: 0
EXCESSIVE BLEEDING: 0
DYSURIA: 0
NUMBNESS: 0
MYALGIAS: 1
WOUND: 0
BLOOD IN STOOL: 0
DYSPNEA AT REST: 0
DYSPNEA WITH EXERTION: 0
VISUAL CHANGE: 0
CONFUSION: 0
CONSTIPATION: 1
ABDOMINAL PAIN: 0
COUGH: 0
ARTHRALGIAS: 1
NECK PAIN: 0
VOMITING: 0
CHILLS: 0
DIARRHEA: 0
TROUBLE SWALLOWING: 0
EYE PAIN: 0
FEVER: 0
HEMOPTYSIS: 0
DOUBLE VISION: 0
LIGHT-HEADEDNESS: 0
NECK STIFFNESS: 0
ABDOMINAL DISTENTION: 0
EYE DISCHARGE: 0
SHORTNESS OF BREATH: 0
LIMITED RANGE OF MOTION: 0

## 2025-06-19 NOTE — PREPROCEDURE INSTRUCTIONS
Medication List            Accurate as of June 19, 2025  9:26 AM. Always use your most recent med list.                acetaminophen 650 mg ER tablet  Commonly known as: Tylenol 8 HOUR     aspirin 81 mg EC tablet     benzonatate 100 mg capsule  Commonly known as: Tessalon     chlorhexidine 0.12 % solution  Commonly known as: Peridex  Swish for 30 seconds and spit 15mL of solution the night before and morning of surgery     cholecalciferol 25 mcg (1,000 units) capsule  Commonly known as: Vitamin D-3     fluticasone 50 mcg/actuation nasal spray  Commonly known as: Flonase     gabapentin 300 mg capsule  Commonly known as: Neurontin     ibuprofen 800 mg tablet  TAKE 1 TABLET (800 MG) BY MOUTH 3 TIMES A DAY AFTER MEALS.     lisinopril 20 mg tablet     magnesium oxide 400 mg tablet  Commonly known as: Mag-Ox     metFORMIN  mg 24 hr tablet  Commonly known as: Glucophage-XR     montelukast 10 mg tablet  Commonly known as: Singulair     omeprazole 40 mg DR capsule  Commonly known as: PriLOSEC     semaglutide 2.5 mg/mL solution     traMADol 50 mg tablet  Commonly known as: Ultram                          **Concerning above medication instructions, if medication is normally taken at night, continue normal schedule.**  **DO NOT TAKE NIGHT PRIOR AND MORNING OF SURGERY**    CONTACT SURGEON'S OFFICE IF YOU DEVELOP:  * Fever = 100.4 F   * New respiratory symptoms (e.g. cough, shortness of breath, respiratory distress, sore throat)  * Recent loss of taste or smell  *Flu like symptoms such as headache, fatigue or gastrointestinal symptoms  * You develop any open sores, shingles, burning or painful urination   AND/OR:  * You no longer wish to have the surgery.  * Any other personal circumstances change that may lead to the need to cancel or defer this surgery.  *You were admitted to any hospital within one week of your planned procedure.    SMOKING:  *Quitting smoking can make a huge difference to your health and recovery  from surgery.    *If you need help with quitting, call 8-922-QUIT-NOW.    THE DAY OF SURGERY:  *Do not eat any food after midnight the night before surgery.   *You must drink 13.5 ounces of clear liquids (i.e. water, black coffee (no milk or cream), tea, apple juice or electrolyte drinks (gatorade)) 2 hours before your arrival time.  *You may chew gum until 2 hours before your surgery    SURGICAL TIME  *You will be contacted between 2 p.m. and 6 p.m. the business day before your surgery with your arrival time.  *If you haven't received a call by 6pm, call [phone].  *Scheduled surgery times may change and you will be notified if this occurs-check your personal voicemail for any updates.    ON THE MORNING OF SURGERY:  *Wear comfortable, loose fitting clothing.   *Do not use moisturizers, creams, lotions or perfume.  *All jewelry and valuables should be left at home.  *Prosthetic devices such as contact lenses, hearing aids, dentures, eyelash extensions, hairpins and body piercing must be removed before surgery.    BRING WITH YOU:  *Photo ID and insurance card  *Current list of medicines and allergies  *Pacemaker/Defibrillator/Heart stent cards  *CPAP machine and mask  *Slings/splints/crutches  *Copy of your complete Advanced Directive/DHPOA-if applicable  *Neurostimulator implant remote    PARKING AND ARRIVAL:  *Check in at the Main Entrance desk and let them know you are here for surgery.  *You will be directed to the 2nd floor surgical waiting area.    AFTER OUTPATIENT SURGERY:  *A responsible adult MUST accompany you at the time of discharge and stay with you for 24 hours after your surgery.  *You may NOT drive yourself home after surgery.  *You may use a taxi or ride sharing service ([employer], Uber) to return home ONLY if you are accompanied by a friend or family member.  *Instructions for resuming your medications will be provided by your surgeon.       Home Preoperative Antibacterial Shower     What is a home  preoperative antibacterial shower?  This shower is a way of cleaning the skin with a germ killing soap before surgery.  The soap contains chlorhexidine, commonly known as CHG.  CHG is a soap for your skin with germ killing ability.  Let your doctor know if you are allergic to chlorhexidine.    Why do I need to take a preoperative antibacterial shower?  Skin is not sterile.  It is best to try to make your skin as free of germs as possible before surgery.  Proper cleansing with a germ killing soap before surgery can lower the number of germs on your skin.  This helps to reduce the risk of infection at the surgical site.  Following the instructions listed below will help you prepare your skin for surgery.      How do I use the CHG skin cleanser?  Steps:  Begin using your CHG soap five days before your scheduled surgery on ________________________.    Days 1-4 Shower before bed:  Wash your face and genitals with your normal soap and rinse.           2.    Apply the CHG soap to a clean wet washcloth.  Turn the water off or move away                From the water spray to avoid premature rinsing of the CHG soap as you are applying.     3.   Lather your entire body from the neck down.  Do not use on your face or genitals.  4. Pay special attention to the area(s) where your incision(s) will be located unless they are on your face.  Avoid scrubbing your skin too hard.  The important point is to have the CHG soap sit on your skin for 3 minutes.    When the 3 minutes are up, turn on the water and rinse the CHG soap off your body completely.   Pat yourself dry with a clean, freshly-laundered towel.  Dress in clean, freshly laundered night clothes.    Be sure to change bed sheets and blankets at least on the first night of CHG body wash use. May change linens every night of the above protocol for maximum benefit.   Day 5:  Last shower is the morning of surgery: Follow above Instructions.    NOTE:        *Keep CHG soap out of  eyes and ear canals   *DO NOT wash with regular soap on your body after you have used the CHG soap solution  *DO NOT apply powders, lotions, or perfume.  *Deodorant may be used days 1-4, BUT NOT the day of surgery    Who should I contact if I have any questions regarding the use of CHG soap?  Call the [facility], Preadmission Testing at [phone] if you have any questions.              Patient Information: Pre-Operative Infection Prevention Measures     Why did I have my nose, under my arms and groin swabbed?  The purpose of the swab is to identify Staphylococcus aureus inside your nose or on your skin.  The swab was sent to the laboratory for culture.  A positive swab/culture for Staphylococcus aureus is called colonization or carriage.      What is Staphylococcus aureus?  Staphylococcus aureus, also known as “staph”, is a germ found on the skin or in the nose of healthy people.  Sometimes Staphylococcus aureus can get into the body and cause an infection.  This can be minor (such as pimples, boils or other skin problems).  It might also be serious (such as blood infection, pneumonia or a surgical site infection).    What is Staphylococcus aureus colonization or carriage?  Colonization or carriage means that a person has the germ but is not sick from it.  These bacteria can be spread on the hands or when breathing or sneezing.    How is Staphylococcus aureus spread?  It is most often spread by close contact with a person or item that carries it.    What happens if my culture is positive for Staphylococcus aureus?  Your doctor/medical team will use this information to guide any antibiotic treatment which may be necessary.  Regardless of the culture results, we will clean the inside of your nose with a betadine swab just before you have your surgery.      Will I get an infection if I have Staphylococcus aureus in my nose or on my skin?  Anyone can get an infection with Staphylococcus  aureus.  However, the best way to reduce your risk of infection is to follow the instructions provided to you for the use of your CHG soap and dental rinse.        Who should I contact if I have any questions?  Call the [facility], Preadmission Testing at [phone] if you have any questions.          Patient Information: Oral/Dental Rinse  **This is a prescription; pick it up at your preferred local pharmacy **  What is oral/dental rinse?   It is a mouthwash. It is a way of cleaning the mouth with a germ killing solution before your surgery.  The solution contains chlorhexidine, commonly known as CHG.   It is used inside the mouth to kill a bacteria known as Staphylococcus aureus.  Let your doctor know if you are allergic to Chlorhexidine.    Why do I need to use CHG oral/dental rinse?  The CHG oral/dental rinse helps to kill a bacteria in your mouth known a Staphylococcus aureus.     This reduces the risk of infection at the surgical site.      Using your CHG oral/dental rinse  STEPS:  Use your CHG oral/dental rinse after you brush your teeth the night before (at bedtime) and the morning of your surgery.  Follow all directions on your prescription label.    Use the cap on the container to measure 15ml (fill cap to fill line)  Swish (gargle if you can) the mouthwash in your mouth for at least 30 seconds, (do not to swallow) spit out  After you use your CHG rinse, do not rinse your mouth with water, drink or eat.  Please refer to prescription label for the appropriate time to resume oral intake  Dental rinse comes in one size bottle: 473ml ~16oz.  You will have leftover    rinse, discard after this use.    What side effects might I have using the CHG oral/dental rinse?  CHG rinse will stick to plaque on the teeth.  Brush and floss just before use.  Teeth brushing will help avoid staining of plaque during use.    Who should I contact if I have questions about the CHG oral/dental  rinse?  Please call [facility], Preadmission Testing at [phone] if you have any questions          Incentive Spirometer   You were provided with an incentive spirometer in [facility], please follow the below instructions.   You were not provided an incentive spirometer in [facility], please disregard the incentive spirometer instructions  What is an incentive spirometer?  An incentive spirometer is a device used before and after surgery to “exercise” your lungs.  It helps you to take deeper breaths to expand your lungs.  Below is an example of a basic incentive spirometer.  The device you receive may differ slightly but they all function the same.    Why do I need to use an incentive spirometer?  Using your incentive spirometer prepares your lungs for surgery and helps prevent lung problems after surgery.  How do I use my incentive spirometer?  When you're using your incentive spirometer, make sure to breathe through your mouth. If you breathe through your nose, the incentive spirometer won't work properly. You can hold your nose if you have trouble.  If you feel dizzy at any time, stop and rest. Try again at a later time.  Follow the steps below:  Set up your incentive spirometer, expand the flexible tubing and connect to the outlet.  Sit upright in a chair or bed. Hold the incentive spirometer at eye level.   Put the mouthpiece in your mouth and close your lips tightly around it. Slowly breathe out (exhale) completely.  Breathe in (inhale) slowly through your mouth as deeply as you can. As you take a breath, you will see the piston rise inside the large column. While the piston rises, the indicator should move upwards. It should stay in between the 2 arrows (see Figure).  Try to get the piston as high as you can, while keeping the indicator between the arrows.   If the indicator doesn't stay between the arrows, you're breathing either too fast or too slow.  When you get it as high as you  can, hold your breath for 10 seconds, or as long as possible. While you're holding your breath, the piston will slowly fall to the base of the spirometer.  Once the piston reaches the bottom of the spirometer, breathe out slowly through your mouth. Rest for a few seconds.  Repeat 10 times. Try to get the piston to the same level with each breath.  Repeat every hour while awake  You can carefully clean the outside of the mouthpiece with an alcohol wipe or soap and water.        Preoperative Deep Breathing Exercises  Why it is important to do deep breathing exercises before my surgery?  Deep breathing exercises strengthen your breathing muscles.  This helps you to recover after your surgery and decreases the chance of breathing complications.  How are the deep breathing exercises done?  Sit straight with your back supported.  Breathe in deeply and slowly through your nose. Your lower rib cage should expand and your abdomen may move forward.  Hold that breath for 3 to 5 seconds.  Breathe out through pursed lips, slowly and completely.  Rest and repeat 10 times every hour while awake.  Rest longer if you become dizzy or lightheaded.        Preoperative Brain Exercises    What are brain exercises?  A brain exercise is any activity that engages your thinking (cognitive) skills.    What types of activities are considered brain exercises?  Jigsaw puzzles, crossword puzzles, word jumble, memory games, word search, and many more.  Many can be found free online or on your phone via a mobile shannon.    Why should I do brain exercises before my surgery?  More recent research has shown brain exercise before surgery can lower the risk of postoperative delirium (confusion) which can be especially important for older adults.  Patients who did brain exercises for 5 to 10 hours (total) for the 7-10 days before surgery, cut their risk of postoperative delirium in half up to 1 week after  surgery.

## 2025-06-19 NOTE — H&P (VIEW-ONLY)
"[facility]/PAT Evaluation       Name: Gabby Turner (Gabby Turner \"Jessica\")  /Age: 1955/69 y.o.         Date of Consult: 25    Referring Provider: Dr. Means    Surgery, Date, and Length:     L4-L5 Anterior Lumbar Spine Fusion and Posterior Instrumentation   25, 180MIN    Gabby Turner is a 69 y.o. year-old female who presents to the [facility] for perioperative risk assessment prior to surgery.    Patient presents with a primary diagnosis of Lumbar stenosis with neurogenic claudication. She has been attempting weight reduction and has lost 35 pounds overall. She has tried LESI which initially worked well; most recently lasting about 3 weeks.  She takes gabapentin and tramadol and ibuprofen for pain which provides some pain relief.  She wishes to proceed with surgery as planned.     This note was created in part upon personal review of patient's medical records.      Patient is scheduled to have L4-L5 Anterior Lumbar Spine Fusion and Posterior Instrumentation      Pt denies any past history of anesthetic complications such as PONV, awareness, prolonged sedation, dental damage, aspiration, cardiac arrest, difficult intubation, difficult I.V. access or unexpected hospital admissions.  NO malignant hyperthermia and or pseudocholinesterase deficiency.  No history of blood transfusions     The patient is not a Hindu and will accept blood and blood products if medically indicated.   Type and screen sent.     Past Medical History:   Diagnosis Date    Arthritis     CKD (chronic kidney disease)     creat 0.9, BUN 22, GFR 68 - 25    GERD (gastroesophageal reflux disease)     HL (hearing loss)     Hyperparathyroidism (Multi)     following with endo - Ca 10.4- 25 - pt was instructed to stop ca supplement and recheck level in Aug    Hypertension     Hypomagnesemia     started on oral supplement - 1.2 on 25    Prediabetes     24: A1C 6.2%    Sleep apnea     BiPAP    Spinal stenosis, " lumbar region with neurogenic claudication        Past Surgical History:   Procedure Laterality Date    BUNIONECTOMY Left     CARPAL TUNNEL RELEASE Bilateral     CATARACT EXTRACTION W/  INTRAOCULAR LENS IMPLANT Bilateral      SECTION, CLASSIC      x 2    CHOLECYSTECTOMY      COLONOSCOPY      TONSILLECTOMY N/A     TOTAL KNEE ARTHROPLASTY Right     TOTAL KNEE ARTHROPLASTY Left        Patient  has no history on file for sexual activity.    Family History   Problem Relation Name Age of Onset    Transient ischemic attack Mother      Other (carotid artery stenosis) Mother      Diabetes Mother      Cancer Father Reji Becerra     Heart attack Father Reji Becerra     Breast cancer Sister      Diabetes Brother       Social History     Socioeconomic History    Marital status:      Spouse name: Not on file    Number of children: Not on file    Years of education: Not on file    Highest education level: Not on file   Occupational History    Not on file   Tobacco Use    Smoking status: Never    Smokeless tobacco: Never   Vaping Use    Vaping status: Never Used   Substance and Sexual Activity    Alcohol use: Never    Drug use: Never    Sexual activity: Not on file   Other Topics Concern    Not on file   Social History Narrative    Not on file     Social Drivers of Health     Financial Resource Strain: Low Risk  (2025)    Received from [facility]    Overall Financial Resource Strain (CARDIA)     Difficulty of Paying Living Expenses: Not very hard   Food Insecurity: No Food Insecurity (2025)    Received from [facility]    Hunger Vital Sign     Worried About Running Out of Food in the Last Year: Never true     Ran Out of Food in the Last Year: Never true   Transportation Needs: No Transportation Needs (2025)    Received from [facility]    PRAPARE - Transportation     Lack of Transportation (Medical): No     Lack of Transportation (Non-Medical): No   Physical Activity:  Inactive (6/13/2025)    Received from [facility]    Exercise Vital Sign     Days of Exercise per Week: 0 days     Minutes of Exercise per Session: 10 min   Stress: No Stress Concern Present (6/13/2025)    Received from [facility]    [employer] [place] [employer] - Occupational Stress Questionnaire     Feeling of Stress : Only a little   Social Connections: Moderately Integrated (6/13/2025)    Received from [facility]    Social Connection and Isolation Panel [NHANES]     Frequency of Communication with Friends and Family: Three times a week     Frequency of Social Gatherings with Friends and Family: Twice a week     Attends Sikhism Services: More than 4 times per year     Active Member of Clubs or Organizations: No     Attends Club or Organization Meetings: Never     Marital Status:    Intimate Partner Violence: Unknown (10/4/2023)    Humiliation, Afraid, Rape, and Kick questionnaire     Fear of Current or Ex-Partner: No     Emotionally Abused: Not on file     Physically Abused: Not on file     Sexually Abused: No   Housing Stability: Unknown (6/13/2025)    Received from [facility]    Housing Stability Vital Sign     Unable to Pay for Housing in the Last Year: No     Number of Times Moved in the Last Year: Not on file     Homeless in the Last Year: Not on file        Allergies   Allergen Reactions    Sulfa (Sulfonamide Antibiotics) Rash       Current Outpatient Medications   Medication Instructions    acetaminophen (TYLENOL 8 HOUR) 650 mg, Every 8 hours PRN    aspirin 81 mg, Daily    benzonatate (TESSALON) 200 mg, Every 8 hours PRN    chlorhexidine (Peridex) 0.12 % solution Swish for 30 seconds and spit 15mL of solution the night before and morning of surgery    cholecalciferol (VITAMIN D-3) 1,000 Units, Daily RT    fluticasone (Flonase) 50 mcg/actuation nasal spray 2 sprays, Daily    gabapentin (NEURONTIN) 300 mg, 3 times daily    ibuprofen 800 mg, oral, 3 times daily  after meals    lisinopril 20 mg, Daily    magnesium oxide (MAG-OX) 400 mg, Daily    metFORMIN XR (GLUCOPHAGE-XR) 500 mg, 2 times daily (morning and late afternoon)    montelukast (Singulair) 10 mg tablet 1 tablet, Nightly    omeprazole (PRILOSEC) 40 mg, Daily before breakfast    semaglutide 2.5 mg/mL solution Once Weekly    traMADol (ULTRAM) 50 mg, 2 times daily           PAT ROS:   Constitutional:    no fever   no chills   no unexpected weight change  Neuro/Psych:    no numbness   no weakness   no light-headedness   no confusion  Eyes:    no discharge   no pain   no vision loss   no diplopia   no visual disturbance  Ears:    no ear pain   no hearing loss   no tinnitus  Nose:    no nasal discharge   no sinus congestion   no epistaxis  Mouth:    no dental issues   no mouth pain   no oral bleeding   no mouth lesions  Throat:    no throat pain   no dysphagia  Neck:    no neck pain   no neck stiffness  Cardio:    Functional 4 Mets. Patient denies SOB walking up 2 flights of stairs   Limited due to pain LBP and LLE; cooking, cleaning, grocery shopping    no chest pain   no palpitations   no peripheral edema   no dyspnea   no HUIZAR  Respiratory:    no cough   no wheezing   no hemoptysis   no shortness of breath  Endocrine:    no cold intolerance   no heat intolerance  GI:    no abdominal distention   no abdominal pain   constipation   no diarrhea   no nausea   no vomiting   no blood in stool  :    no difficulty urinating   no dysuria   no oliguria  Musculoskeletal:    arthralgias (LBP)   myalgias (LLE)   no decreased ROM  Hematologic:    bruises/bleeds easily (ASA 81mg)   no excessive bleeding   no history of blood transfusion   no blood clots  Skin:   no skin changes   no sores/wound   no rash      Physical Exam  Constitutional:       General: She is not in acute distress.     Appearance: Normal appearance. She is not ill-appearing, toxic-appearing or diaphoretic.   HENT:      Head: Normocephalic and atraumatic.       "Nose: Nose normal. No congestion or rhinorrhea.      Mouth/Throat:      Mouth: Mucous membranes are moist.      Pharynx: No posterior oropharyngeal erythema.   Eyes:      Extraocular Movements: Extraocular movements intact.      Conjunctiva/sclera: Conjunctivae normal.   Cardiovascular:      Rate and Rhythm: Normal rate and regular rhythm.      Pulses: Normal pulses.      Heart sounds: Normal heart sounds. No murmur heard.     No friction rub. No gallop.   Pulmonary:      Effort: Pulmonary effort is normal. No respiratory distress.      Breath sounds: Normal breath sounds. No stridor. No wheezing, rhonchi or rales.   Abdominal:      General: Bowel sounds are normal. There is no distension.      Palpations: Abdomen is soft. There is no mass.      Tenderness: There is no abdominal tenderness. There is no guarding or rebound.      Hernia: No hernia is present.   Musculoskeletal:         General: Tenderness (lumbar spine; pain and decreased ROM with lumbar extension) present. No swelling, deformity or signs of injury. Normal range of motion.      Cervical back: Normal range of motion and neck supple. No rigidity or tenderness.   Skin:     General: Skin is warm and dry.      Coloration: Skin is not jaundiced or pale.      Findings: No bruising, erythema or rash.   Neurological:      General: No focal deficit present.      Mental Status: She is alert and oriented to person, place, and time.      Cranial Nerves: No cranial nerve deficit.      Sensory: No sensory deficit.      Motor: No weakness.      Coordination: Coordination normal.   Psychiatric:         Mood and Affect: Mood normal.         Behavior: Behavior normal.          PAT AIRWAY:   Airway:     Mallampati::  III    Neck ROM::  Full   No broken teeth, no dentures and no missing teeth              Visit Vitals  /58   Pulse 83   Temp 35.9 °C (96.7 °F) (Temporal)   Resp 16   Ht 1.505 m (4' 11.25\")   Wt 85 kg (187 lb 6.3 oz)   SpO2 98%   BMI 37.53 kg/m²   OB " Status Postmenopausal   Smoking Status Never   BSA 1.89 m²     LABS:  Lab Results   Component Value Date    WBC 8.7 06/19/2025    HGB 13.3 06/19/2025    HCT 41.9 06/19/2025    MCV 97 06/19/2025     06/19/2025      Lab Results   Component Value Date    GLUCOSE 80 06/19/2025    CALCIUM 10.3 06/19/2025     06/19/2025    K 4.3 06/19/2025    CO2 22 06/19/2025     06/19/2025    BUN 18 06/19/2025    CREATININE 0.87 06/19/2025      Lab Results   Component Value Date    INR 1.0 06/19/2025    PROTIME 10.6 06/19/2025      Lab Results   Component Value Date    HGBA1C 5.2 06/19/2025        EKG 6/19/25  NSR  Normal EKG  Vent rate = 77 bpm      Assessment and Plan:   69 y.o.  female  scheduled for L4-L5 Anterior Lumbar Spine Fusion and Posterior Instrumentation  on 6/25/25 with Dr. Means for  lumbar stenosis.   Presents to CPM today for perioperative risk stratification and optimization    Cardiovascular:  Patient has no active cardiac symptoms.   Patient denies any chest pain, tightness, heaviness, pressure, radiating pain, palpitations, irregular heartbeats, lightheadedness, cough, congestion, shortness of breath, HUIZAR, PND, near syncope, weight loss or gain.    METS: 4.7  RCRI: 0 points, 3.9%  risk for postoperative MACE     HTN - lisinopril HOLD evening prior to surgery and morning of surgery     Encouraged lifestyle modifications, low-sodium diet, and increase activity as tolerated.  Monitor BP and follow up with managing physician for readings sustaining >140/90.     Pulmonary:  No pulmonary diagnosis, however patient is at increased risk of perioperative complications secondary to  age > 60, obesity, duration of surgery > 2 hours  Stop Bang +CINDY  ARISCAT: <26 points, 1.6% risk of in-hospital postoperative pulmonary complication  PRODIGY: High risk for opioid induced respiratory depression    **Pt provided with deep breathing exercises, incentive spirometer and instructions for use during PAT visit  today**    CINDY - Known and treated  CINYD- Patient was instructed to bring CPAP machine the morning of surgery. Recommend prioritizing  nonopioid analgesic techniques (regional and local anesthesia, nonsteroidal medications, etc) before the administration of opioids and close monitoring for hypoventilation after surgery due to CINDY. Increased vigilance is recommended with the use of narcotics due to an increased risk for opioid induced respiratory depression     Endocrine:  DMII - hold semaglutide on dos - pt provided with clear liquid diet instructions for day before surgery ; hold metformin on dos   A1c 7/9/24 = 6.2%  A1c 6/20/25 = 5.2%    Hematology:  Patient instructed to ambulate as soon as possible postoperatively to decrease thromboembolic risk.   Initiate mechanical DVT prophylaxis as soon as possible and initiate chemical prophylaxis when deemed safe from a bleeding standpoint post surgery.     LABS: CBC, BMP, T&S, coag, MRSA, A1c and EKG ordered. Lab results reviewed and unremarkable    Followup: MRSA and A1c pending; A1c reviewed and unremarkable    Caprini: 5    Risk assessment complete.  Patient is scheduled for a low surgical risk procedure.        Preoperative medication instructions were provided and reviewed with the patient.  Any additional testing or evaluation was explained to the patient.  Nothing by mouth instructions were discussed and patient's questions were answered prior to conclusion to this encounter.  Patient verbalized understanding of preoperative instructions given in preadmission testing; discharge instructions available in EMR.    This note was dictated by a speech recognition.  Minor errors may have been detected in a speech recognition.

## 2025-06-19 NOTE — PREPROCEDURE INSTRUCTIONS
Medication List            Accurate as of June 19, 2025  9:29 AM. Always use your most recent med list.                acetaminophen 650 mg ER tablet  Commonly known as: Tylenol 8 HOUR  Medication Adjustments for Surgery: Take/Use as prescribed     aspirin 81 mg EC tablet  Medication Adjustments for Surgery: Take/Use as prescribed     benzonatate 100 mg capsule  Commonly known as: Tessalon  Medication Adjustments for Surgery: Take/Use as prescribed     chlorhexidine 0.12 % solution  Commonly known as: Peridex  Swish for 30 seconds and spit 15mL of solution the night before and morning of surgery  Medication Adjustments for Surgery: Take/Use as prescribed     cholecalciferol 25 mcg (1,000 units) capsule  Commonly known as: Vitamin D-3  Medication Adjustments for Surgery: Do Not take on the morning of surgery     fluticasone 50 mcg/actuation nasal spray  Commonly known as: Flonase  Medication Adjustments for Surgery: Take/Use as prescribed     gabapentin 300 mg capsule  Commonly known as: Neurontin  Medication Adjustments for Surgery: Take/Use as prescribed     ibuprofen 800 mg tablet  TAKE 1 TABLET (800 MG) BY MOUTH 3 TIMES A DAY AFTER MEALS.  Additional Medication Adjustments for Surgery: Take last dose 7 days before surgery  Notes to patient: Stop now     lisinopril 20 mg tablet  Medication Adjustments for Surgery: Take last dose 1 day (24 hours) before surgery  Notes to patient: HOLD evening prior to surgery and morning of surgery        magnesium oxide 400 mg tablet  Commonly known as: Mag-Ox  Medication Adjustments for Surgery: Do Not take on the morning of surgery     metFORMIN  mg 24 hr tablet  Commonly known as: Glucophage-XR  Medication Adjustments for Surgery: Do Not take on the morning of surgery     montelukast 10 mg tablet  Commonly known as: Singulair  Medication Adjustments for Surgery: Take/Use as prescribed     omeprazole 40 mg DR capsule  Commonly known as: PriLOSEC  Medication Adjustments  for Surgery: Take on the morning of surgery     semaglutide 2.5 mg/mL solution  Medication Adjustments for Surgery: Do Not take on the morning of surgery     traMADol 50 mg tablet  Commonly known as: Ultram  Medication Adjustments for Surgery: Take/Use as prescribed                          Preoperative Fasting Guidelines      Why must I stop eating and drinking before surgery?   With anesthesia, food or liquid in your stomach can enter your lungs causing serious complications   GLP-1 medications can slow the movement of food through your stomach and intestines.  This further increases the risk of food entering your lungs with anesthesia     When do I need to stop eating and drinking before my surgery?   To help ensure food has passed out of your stomach, START a clear liquid diet 24 hours before your surgery   On the day of your surgery/procedure, STOP all clear liquids 2 hours before your arrival time to the hospital/facility      A clear liquid diet consists of clear liquids and foods that melt into a clear liquid (i.e. gelatin) and excludes solid foods and liquids you cannot see through (i.e. milk). Clears can and should contain sugar to obtain a sufficient number of calories.  A clear liquid diet includes   Clear, fat-free broth   Clear nutritional drinks   Pulp-free popsicles, vegetable and fruit juice   Gelatin   Coffee and tea without creamer or milk   Clear soda and sports drinks      Diabetic Patients   Clear liquids should not be sugar-free    Check your blood glucose levels as you normally do   If you have symptoms of low blood glucose (shakiness, sweating, dizziness, confusion) or high blood glucose (dry mouth, excessive thirst, frequent urination, blurry vision), check your blood glucose level   For low blood glucose increase your consumption of sugar-containing clear liquid    For high blood glucose, decrease your consumption of sugar-containing clears and treat as you normally would   If symptoms  persist seek medical attention      Examples of GLP-1 Medications   Trulicity   Ozempic   Mounjaro   Zepbound   Bydyreon   Tanzbritneyclark Marinchristiano   Swapna Nino         CONTACT SURGEON'S OFFICE IF YOU DEVELOP:  * Fever = 100.4 F   * New respiratory symptoms (e.g. cough, shortness of breath, respiratory distress, sore throat)  * Recent loss of taste or smell  *Flu like symptoms such as headache, fatigue or gastrointestinal symptoms  * You develop any open sores, shingles, burning or painful urination   AND/OR:  * You no longer wish to have the surgery.  * Any other personal circumstances change that may lead to the need to cancel or defer this surgery.  *You were admitted to any hospital within one week of your planned procedure.    SMOKING:  *Quitting smoking can make a huge difference to your health and recovery from surgery.    *If you need help with quitting, call [phone]-NOW.    SURGICAL TIME:  *You will be contacted between 2 p.m. and 6 p.m. the business day before your surgery with your arrival time.  *If you haven't received a call by 6pm, call [phone].  *Scheduled surgery times may change and you will be notified if this occurs-check your personal voicemail for any updates.    ON THE MORNING OF SURGERY:  *Wear comfortable, loose fitting clothing.   *Do not use moisturizers, creams, lotions or perfume.  *All jewelry and valuables should be left at home.  *Prosthetic devices such as contact lenses, hearing aids, dentures, eyelash extensions, hairpins and body piercing must be removed before surgery.    BRING WITH YOU:  *Photo ID and insurance card  *Current list of medications and allergies  *Pacemaker/Defibrillator/Heart stent cards  *CPAP machine and mask  *Slings/splints/crutches  *Copy of your complete Advanced Directive/DHPOA-if applicable  *Neurostimulator implant remote    PARKING AND ARRIVAL:  *Check in at the Main Entrance desk and let them know you are here for surgery.  *You will  be directed to the 2nd floor surgical waiting area.    IF YOU ARE HAVING OUTPATIENT/SAME DAY SURGERY:  *A responsible adult MUST accompany you at the time of discharge and stay with you for 24 hours after your surgery.  *You may NOT drive yourself home after surgery.  *You may use a taxi or ride sharing service ([employer], Uber) to return home ONLY if you are accompanied by a friend or family member.  *Instructions for resuming your medications will be provided by your surgeon.      Home Preoperative Antibacterial Shower     What is a home preoperative antibacterial shower?  This shower is a way of cleaning the skin with a germ killing soap before surgery.  The soap contains chlorhexidine, commonly known as CHG.  CHG is a soap for your skin with germ killing ability.  Let your doctor know if you are allergic to chlorhexidine.    Why do I need to take a preoperative antibacterial shower?  Skin is not sterile.  It is best to try to make your skin as free of germs as possible before surgery.  Proper cleansing with a germ killing soap before surgery can lower the number of germs on your skin.  This helps to reduce the risk of infection at the surgical site.  Following the instructions listed below will help you prepare your skin for surgery.      How do I use the CHG skin cleanser?  Steps:  Begin using your CHG soap five days before your scheduled surgery on ________________________.    Days 1-4 Shower before bed:  Wash your face and genitals with your normal soap and rinse.           2.    Apply the CHG soap to a clean wet washcloth.  Turn the water off or move away                From the water spray to avoid premature rinsing of the CHG soap as you are applying.     3.   Lather your entire body from the neck down.  Do not use on your face or genitals.  4. Pay special attention to the area(s) where your incision(s) will be located unless they are on your face.  Avoid scrubbing your skin too hard.  The important point is to  have the CHG soap sit on your skin for 3 minutes.    When the 3 minutes are up, turn on the water and rinse the CHG soap off your body completely.   Pat yourself dry with a clean, freshly-laundered towel.  Dress in clean, freshly laundered night clothes.    Be sure to change bed sheets and blankets at least on the first night of CHG body wash use. May change linens every night of the above protocol for maximum benefit.   Day 5:  Last shower is the morning of surgery: Follow above Instructions.    NOTE:        *Keep CHG soap out of eyes and ear canals   *DO NOT wash with regular soap on your body after you have used the CHG soap solution  *DO NOT apply powders, lotions, or perfume.  *Deodorant may be used days 1-4, BUT NOT the day of surgery    Who should I contact if I have any questions regarding the use of CHG soap?  Call the [facility], Preadmission Testing at [phone] if you have any questions.              Patient Information: Pre-Operative Infection Prevention Measures     Why did I have my nose, under my arms and groin swabbed?  The purpose of the swab is to identify Staphylococcus aureus inside your nose or on your skin.  The swab was sent to the laboratory for culture.  A positive swab/culture for Staphylococcus aureus is called colonization or carriage.      What is Staphylococcus aureus?  Staphylococcus aureus, also known as “staph”, is a germ found on the skin or in the nose of healthy people.  Sometimes Staphylococcus aureus can get into the body and cause an infection.  This can be minor (such as pimples, boils or other skin problems).  It might also be serious (such as blood infection, pneumonia or a surgical site infection).    What is Staphylococcus aureus colonization or carriage?  Colonization or carriage means that a person has the germ but is not sick from it.  These bacteria can be spread on the hands or when breathing or sneezing.    How is Staphylococcus  aureus spread?  It is most often spread by close contact with a person or item that carries it.    What happens if my culture is positive for Staphylococcus aureus?  Your doctor/medical team will use this information to guide any antibiotic treatment which may be necessary.  Regardless of the culture results, we will clean the inside of your nose with a betadine swab just before you have your surgery.      Will I get an infection if I have Staphylococcus aureus in my nose or on my skin?  Anyone can get an infection with Staphylococcus aureus.  However, the best way to reduce your risk of infection is to follow the instructions provided to you for the use of your CHG soap and dental rinse.        Who should I contact if I have any questions?  Call the [facility], Preadmission Testing at [phone] if you have any questions.          Patient Information: Oral/Dental Rinse  **This is a prescription; pick it up at your preferred local pharmacy **  What is oral/dental rinse?   It is a mouthwash. It is a way of cleaning the mouth with a germ killing solution before your surgery.  The solution contains chlorhexidine, commonly known as CHG.   It is used inside the mouth to kill a bacteria known as Staphylococcus aureus.  Let your doctor know if you are allergic to Chlorhexidine.    Why do I need to use CHG oral/dental rinse?  The CHG oral/dental rinse helps to kill a bacteria in your mouth known a Staphylococcus aureus.     This reduces the risk of infection at the surgical site.      Using your CHG oral/dental rinse  STEPS:  Use your CHG oral/dental rinse after you brush your teeth the night before (at bedtime) and the morning of your surgery.  Follow all directions on your prescription label.    Use the cap on the container to measure 15ml (fill cap to fill line)  Swish (gargle if you can) the mouthwash in your mouth for at least 30 seconds, (do not to swallow) spit out  After you use your  CHG rinse, do not rinse your mouth with water, drink or eat.  Please refer to prescription label for the appropriate time to resume oral intake  Dental rinse comes in one size bottle: 473ml ~16oz.  You will have leftover    rinse, discard after this use.    What side effects might I have using the CHG oral/dental rinse?  CHG rinse will stick to plaque on the teeth.  Brush and floss just before use.  Teeth brushing will help avoid staining of plaque during use.    Who should I contact if I have questions about the CHG oral/dental rinse?  Please call [facility], Preadmission Testing at [phone] if you have any questions          Incentive Spirometer   You were provided with an incentive spirometer in [facility], please follow the below instructions.   You were not provided an incentive spirometer in [facility], please disregard the incentive spirometer instructions  What is an incentive spirometer?  An incentive spirometer is a device used before and after surgery to “exercise” your lungs.  It helps you to take deeper breaths to expand your lungs.  Below is an example of a basic incentive spirometer.  The device you receive may differ slightly but they all function the same.    Why do I need to use an incentive spirometer?  Using your incentive spirometer prepares your lungs for surgery and helps prevent lung problems after surgery.  How do I use my incentive spirometer?  When you're using your incentive spirometer, make sure to breathe through your mouth. If you breathe through your nose, the incentive spirometer won't work properly. You can hold your nose if you have trouble.  If you feel dizzy at any time, stop and rest. Try again at a later time.  Follow the steps below:  Set up your incentive spirometer, expand the flexible tubing and connect to the outlet.  Sit upright in a chair or bed. Hold the incentive spirometer at eye level.   Put the mouthpiece in your mouth and close your lips  tightly around it. Slowly breathe out (exhale) completely.  Breathe in (inhale) slowly through your mouth as deeply as you can. As you take a breath, you will see the piston rise inside the large column. While the piston rises, the indicator should move upwards. It should stay in between the 2 arrows (see Figure).  Try to get the piston as high as you can, while keeping the indicator between the arrows.   If the indicator doesn't stay between the arrows, you're breathing either too fast or too slow.  When you get it as high as you can, hold your breath for 10 seconds, or as long as possible. While you're holding your breath, the piston will slowly fall to the base of the spirometer.  Once the piston reaches the bottom of the spirometer, breathe out slowly through your mouth. Rest for a few seconds.  Repeat 10 times. Try to get the piston to the same level with each breath.  Repeat every hour while awake  You can carefully clean the outside of the mouthpiece with an alcohol wipe or soap and water.        Preoperative Deep Breathing Exercises  Why it is important to do deep breathing exercises before my surgery?  Deep breathing exercises strengthen your breathing muscles.  This helps you to recover after your surgery and decreases the chance of breathing complications.  How are the deep breathing exercises done?  Sit straight with your back supported.  Breathe in deeply and slowly through your nose. Your lower rib cage should expand and your abdomen may move forward.  Hold that breath for 3 to 5 seconds.  Breathe out through pursed lips, slowly and completely.  Rest and repeat 10 times every hour while awake.  Rest longer if you become dizzy or lightheaded.        Preoperative Brain Exercises    What are brain exercises?  A brain exercise is any activity that engages your thinking (cognitive) skills.    What types of activities are considered brain exercises?  Jigsaw puzzles, crossword puzzles, word jumble, memory  games, word search, and many more.  Many can be found free online or on your phone via a mobile shannon.    Why should I do brain exercises before my surgery?  More recent research has shown brain exercise before surgery can lower the risk of postoperative delirium (confusion) which can be especially important for older adults.  Patients who did brain exercises for 5 to 10 hours (total) for the 7-10 days before surgery, cut their risk of postoperative delirium in half up to 1 week after surgery.

## 2025-06-19 NOTE — CPM/PAT H&P
"[facility]/PAT Evaluation       Name: Gabby Turner (Gabby Turner \"Jessica\")  /Age: 1955/69 y.o.         Date of Consult: 25    Referring Provider: Dr. Means    Surgery, Date, and Length:     L4-L5 Anterior Lumbar Spine Fusion and Posterior Instrumentation   25, 180MIN    Gabby Turner is a 69 y.o. year-old female who presents to the [facility] for perioperative risk assessment prior to surgery.    Patient presents with a primary diagnosis of Lumbar stenosis with neurogenic claudication. She has been attempting weight reduction and has lost 35 pounds overall. She has tried LESI which initially worked well; most recently lasting about 3 weeks.  She takes gabapentin and tramadol and ibuprofen for pain which provides some pain relief.  She wishes to proceed with surgery as planned.     This note was created in part upon personal review of patient's medical records.      Patient is scheduled to have L4-L5 Anterior Lumbar Spine Fusion and Posterior Instrumentation      Pt denies any past history of anesthetic complications such as PONV, awareness, prolonged sedation, dental damage, aspiration, cardiac arrest, difficult intubation, difficult I.V. access or unexpected hospital admissions.  NO malignant hyperthermia and or pseudocholinesterase deficiency.  No history of blood transfusions     The patient is not a [employer] and will accept blood and blood products if medically indicated.   Type and screen sent.     Past Medical History:   Diagnosis Date    Arthritis     CKD (chronic kidney disease)     creat 0.9, BUN 22, GFR 68 - 25    GERD (gastroesophageal reflux disease)     HL (hearing loss)     Hyperparathyroidism (Multi)     following with endo - Ca 10.4- 25 - pt was instructed to stop ca supplement and recheck level in Aug    Hypertension     Hypomagnesemia     started on oral supplement - 1.2 on 25    Prediabetes     24: A1C 6.2%    Sleep apnea     BiPAP    Spinal stenosis, " lumbar region with neurogenic claudication        Past Surgical History:   Procedure Laterality Date    BUNIONECTOMY Left     CARPAL TUNNEL RELEASE Bilateral     CATARACT EXTRACTION W/  INTRAOCULAR LENS IMPLANT Bilateral      SECTION, CLASSIC      x 2    CHOLECYSTECTOMY      COLONOSCOPY      TONSILLECTOMY N/A     TOTAL KNEE ARTHROPLASTY Right     TOTAL KNEE ARTHROPLASTY Left        Patient  has no history on file for sexual activity.    Family History   Problem Relation Name Age of Onset    Transient ischemic attack Mother      Other (carotid artery stenosis) Mother      Diabetes Mother      Cancer Father Reji Becerra     Heart attack Father Reji Becerra     Breast cancer Sister      Diabetes Brother       Social History     Socioeconomic History    Marital status:      Spouse name: Not on file    Number of children: Not on file    Years of education: Not on file    Highest education level: Not on file   Occupational History    Not on file   Tobacco Use    Smoking status: Never    Smokeless tobacco: Never   Vaping Use    Vaping status: Never Used   Substance and Sexual Activity    Alcohol use: Never    Drug use: Never    Sexual activity: Not on file   Other Topics Concern    Not on file   Social History Narrative    Not on file     Social Drivers of Health     Financial Resource Strain: Low Risk  (2025)    Received from [facility]    Overall Financial Resource Strain (CARDIA)     Difficulty of Paying Living Expenses: Not very hard   Food Insecurity: No Food Insecurity (2025)    Received from [facility]    Hunger Vital Sign     Worried About Running Out of Food in the Last Year: Never true     Ran Out of Food in the Last Year: Never true   Transportation Needs: No Transportation Needs (2025)    Received from [facility]    PRAPARE - Transportation     Lack of Transportation (Medical): No     Lack of Transportation (Non-Medical): No   Physical Activity:  Inactive (6/13/2025)    Received from [facility]    Exercise Vital Sign     Days of Exercise per Week: 0 days     Minutes of Exercise per Session: 10 min   Stress: No Stress Concern Present (6/13/2025)    Received from [facility]    [place] [place] [employer] - Occupational Stress Questionnaire     Feeling of Stress : Only a little   Social Connections: Moderately Integrated (6/13/2025)    Received from [facility]    Social Connection and Isolation Panel [NHANES]     Frequency of Communication with Friends and Family: Three times a week     Frequency of Social Gatherings with Friends and Family: Twice a week     Attends Orthodoxy Services: More than 4 times per year     Active Member of Clubs or Organizations: No     Attends Club or Organization Meetings: Never     Marital Status:    Intimate Partner Violence: Unknown (10/4/2023)    Humiliation, Afraid, Rape, and Kick questionnaire     Fear of Current or Ex-Partner: No     Emotionally Abused: Not on file     Physically Abused: Not on file     Sexually Abused: No   Housing Stability: Unknown (6/13/2025)    Received from [facility]    Housing Stability Vital Sign     Unable to Pay for Housing in the Last Year: No     Number of Times Moved in the Last Year: Not on file     Homeless in the Last Year: Not on file        Allergies   Allergen Reactions    Sulfa (Sulfonamide Antibiotics) Rash       Current Outpatient Medications   Medication Instructions    acetaminophen (TYLENOL 8 HOUR) 650 mg, Every 8 hours PRN    aspirin 81 mg, Daily    benzonatate (TESSALON) 200 mg, Every 8 hours PRN    chlorhexidine (Peridex) 0.12 % solution Swish for 30 seconds and spit 15mL of solution the night before and morning of surgery    cholecalciferol (VITAMIN D-3) 1,000 Units, Daily RT    fluticasone (Flonase) 50 mcg/actuation nasal spray 2 sprays, Daily    gabapentin (NEURONTIN) 300 mg, 3 times daily    ibuprofen 800 mg, oral, 3 times daily  after meals    lisinopril 20 mg, Daily    magnesium oxide (MAG-OX) 400 mg, Daily    metFORMIN XR (GLUCOPHAGE-XR) 500 mg, 2 times daily (morning and late afternoon)    montelukast (Singulair) 10 mg tablet 1 tablet, Nightly    omeprazole (PRILOSEC) 40 mg, Daily before breakfast    semaglutide 2.5 mg/mL solution Once Weekly    traMADol (ULTRAM) 50 mg, 2 times daily           PAT ROS:   Constitutional:    no fever   no chills   no unexpected weight change  Neuro/Psych:    no numbness   no weakness   no light-headedness   no confusion  Eyes:    no discharge   no pain   no vision loss   no diplopia   no visual disturbance  Ears:    no ear pain   no hearing loss   no tinnitus  Nose:    no nasal discharge   no sinus congestion   no epistaxis  Mouth:    no dental issues   no mouth pain   no oral bleeding   no mouth lesions  Throat:    no throat pain   no dysphagia  Neck:    no neck pain   no neck stiffness  Cardio:    Functional 4 Mets. Patient denies SOB walking up 2 flights of stairs   Limited due to pain LBP and LLE; cooking, cleaning, grocery shopping    no chest pain   no palpitations   no peripheral edema   no dyspnea   no HUIZAR  Respiratory:    no cough   no wheezing   no hemoptysis   no shortness of breath  Endocrine:    no cold intolerance   no heat intolerance  GI:    no abdominal distention   no abdominal pain   constipation   no diarrhea   no nausea   no vomiting   no blood in stool  :    no difficulty urinating   no dysuria   no oliguria  Musculoskeletal:    arthralgias (LBP)   myalgias (LLE)   no decreased ROM  Hematologic:    bruises/bleeds easily (ASA 81mg)   no excessive bleeding   no history of blood transfusion   no blood clots  Skin:   no skin changes   no sores/wound   no rash      Physical Exam  Constitutional:       General: She is not in acute distress.     Appearance: Normal appearance. She is not ill-appearing, toxic-appearing or diaphoretic.   HENT:      Head: Normocephalic and atraumatic.       "Nose: Nose normal. No congestion or rhinorrhea.      Mouth/Throat:      Mouth: Mucous membranes are moist.      Pharynx: No posterior oropharyngeal erythema.   Eyes:      Extraocular Movements: Extraocular movements intact.      Conjunctiva/sclera: Conjunctivae normal.   Cardiovascular:      Rate and Rhythm: Normal rate and regular rhythm.      Pulses: Normal pulses.      Heart sounds: Normal heart sounds. No murmur heard.     No friction rub. No gallop.   Pulmonary:      Effort: Pulmonary effort is normal. No respiratory distress.      Breath sounds: Normal breath sounds. No stridor. No wheezing, rhonchi or rales.   Abdominal:      General: Bowel sounds are normal. There is no distension.      Palpations: Abdomen is soft. There is no mass.      Tenderness: There is no abdominal tenderness. There is no guarding or rebound.      Hernia: No hernia is present.   Musculoskeletal:         General: Tenderness (lumbar spine; pain and decreased ROM with lumbar extension) present. No swelling, deformity or signs of injury. Normal range of motion.      Cervical back: Normal range of motion and neck supple. No rigidity or tenderness.   Skin:     General: Skin is warm and dry.      Coloration: Skin is not jaundiced or pale.      Findings: No bruising, erythema or rash.   Neurological:      General: No focal deficit present.      Mental Status: She is alert and oriented to person, place, and time.      Cranial Nerves: No cranial nerve deficit.      Sensory: No sensory deficit.      Motor: No weakness.      Coordination: Coordination normal.   Psychiatric:         Mood and Affect: Mood normal.         Behavior: Behavior normal.          PAT AIRWAY:   Airway:     Mallampati::  III    Neck ROM::  Full   No broken teeth, no dentures and no missing teeth              Visit Vitals  /58   Pulse 83   Temp 35.9 °C (96.7 °F) (Temporal)   Resp 16   Ht 1.505 m (4' 11.25\")   Wt 85 kg (187 lb 6.3 oz)   SpO2 98%   BMI 37.53 kg/m²   OB " Status Postmenopausal   Smoking Status Never   BSA 1.89 m²     LABS:  Lab Results   Component Value Date    WBC 8.7 06/19/2025    HGB 13.3 06/19/2025    HCT 41.9 06/19/2025    MCV 97 06/19/2025     06/19/2025      Lab Results   Component Value Date    GLUCOSE 80 06/19/2025    CALCIUM 10.3 06/19/2025     06/19/2025    K 4.3 06/19/2025    CO2 22 06/19/2025     06/19/2025    BUN 18 06/19/2025    CREATININE 0.87 06/19/2025      Lab Results   Component Value Date    INR 1.0 06/19/2025    PROTIME 10.6 06/19/2025      Lab Results   Component Value Date    HGBA1C 5.2 06/19/2025        EKG 6/19/25  NSR  Normal EKG  Vent rate = 77 bpm      Assessment and Plan:   69 y.o.  female  scheduled for L4-L5 Anterior Lumbar Spine Fusion and Posterior Instrumentation  on 6/25/25 with Dr. Means for  lumbar stenosis.   Presents to CPM today for perioperative risk stratification and optimization    Cardiovascular:  Patient has no active cardiac symptoms.   Patient denies any chest pain, tightness, heaviness, pressure, radiating pain, palpitations, irregular heartbeats, lightheadedness, cough, congestion, shortness of breath, HUIZAR, PND, near syncope, weight loss or gain.    METS: 4.7  RCRI: 0 points, 3.9%  risk for postoperative MACE     HTN - lisinopril HOLD evening prior to surgery and morning of surgery     Encouraged lifestyle modifications, low-sodium diet, and increase activity as tolerated.  Monitor BP and follow up with managing physician for readings sustaining >140/90.     Pulmonary:  No pulmonary diagnosis, however patient is at increased risk of perioperative complications secondary to  age > 60, obesity, duration of surgery > 2 hours  Stop Bang +CINDY  ARISCAT: <26 points, 1.6% risk of in-hospital postoperative pulmonary complication  PRODIGY: High risk for opioid induced respiratory depression    **Pt provided with deep breathing exercises, incentive spirometer and instructions for use during PAT visit  today**    CINDY - Known and treated  CINDY- Patient was instructed to bring CPAP machine the morning of surgery. Recommend prioritizing  nonopioid analgesic techniques (regional and local anesthesia, nonsteroidal medications, etc) before the administration of opioids and close monitoring for hypoventilation after surgery due to CINDY. Increased vigilance is recommended with the use of narcotics due to an increased risk for opioid induced respiratory depression     Endocrine:  DMII - hold semaglutide on dos - pt provided with clear liquid diet instructions for day before surgery ; hold metformin on dos   A1c 7/9/24 = 6.2%  A1c 6/20/25 = 5.2%    Hematology:  Patient instructed to ambulate as soon as possible postoperatively to decrease thromboembolic risk.   Initiate mechanical DVT prophylaxis as soon as possible and initiate chemical prophylaxis when deemed safe from a bleeding standpoint post surgery.     LABS: CBC, BMP, T&S, coag, MRSA, A1c and EKG ordered. Lab results reviewed and unremarkable    Followup: MRSA and A1c pending; A1c reviewed and unremarkable    Caprini: 5    Risk assessment complete.  Patient is scheduled for a low surgical risk procedure.        Preoperative medication instructions were provided and reviewed with the patient.  Any additional testing or evaluation was explained to the patient.  Nothing by mouth instructions were discussed and patient's questions were answered prior to conclusion to this encounter.  Patient verbalized understanding of preoperative instructions given in preadmission testing; discharge instructions available in EMR.    This note was dictated by a speech recognition.  Minor errors may have been detected in a speech recognition.

## 2025-06-20 ENCOUNTER — HOSPITAL ENCOUNTER (OUTPATIENT)
Dept: CARDIOLOGY | Facility: HOSPITAL | Age: 70
Discharge: HOME | End: 2025-06-20
Payer: MEDICARE

## 2025-06-20 PROCEDURE — 93005 ELECTROCARDIOGRAM TRACING: CPT

## 2025-06-21 LAB — STAPHYLOCOCCUS SPEC CULT: ABNORMAL

## 2025-06-25 ENCOUNTER — APPOINTMENT (OUTPATIENT)
Dept: RADIOLOGY | Facility: HOSPITAL | Age: 70
End: 2025-06-25
Payer: MEDICARE

## 2025-06-25 ENCOUNTER — ANESTHESIA EVENT (OUTPATIENT)
Dept: OPERATING ROOM | Facility: HOSPITAL | Age: 70
End: 2025-06-25
Payer: MEDICARE

## 2025-06-25 ENCOUNTER — HOSPITAL ENCOUNTER (INPATIENT)
Facility: HOSPITAL | Age: 70
LOS: 1 days | Discharge: HOME | End: 2025-06-26
Attending: ORTHOPAEDIC SURGERY | Admitting: ORTHOPAEDIC SURGERY
Payer: MEDICARE

## 2025-06-25 ENCOUNTER — ANESTHESIA (OUTPATIENT)
Dept: OPERATING ROOM | Facility: HOSPITAL | Age: 70
End: 2025-06-25
Payer: MEDICARE

## 2025-06-25 DIAGNOSIS — M54.16 LUMBAR RADICULOPATHY: ICD-10-CM

## 2025-06-25 DIAGNOSIS — M54.16 RADICULOPATHY OF LUMBAR REGION: ICD-10-CM

## 2025-06-25 DIAGNOSIS — M48.062 LUMBAR STENOSIS WITH NEUROGENIC CLAUDICATION: ICD-10-CM

## 2025-06-25 DIAGNOSIS — M54.50 LUMBAR SPINE PAIN: Primary | ICD-10-CM

## 2025-06-25 DIAGNOSIS — M43.16 SPONDYLOLISTHESIS OF LUMBAR REGION: ICD-10-CM

## 2025-06-25 LAB
GLUCOSE BLD MANUAL STRIP-MCNC: 145 MG/DL (ref 74–99)
GLUCOSE BLD MANUAL STRIP-MCNC: 174 MG/DL (ref 74–99)

## 2025-06-25 PROCEDURE — 76000 FLUOROSCOPY <1 HR PHYS/QHP: CPT

## 2025-06-25 PROCEDURE — C1769 GUIDE WIRE: HCPCS | Performed by: ORTHOPAEDIC SURGERY

## 2025-06-25 PROCEDURE — 3700000002 HC GENERAL ANESTHESIA TIME - EACH INCREMENTAL 1 MINUTE: Performed by: ORTHOPAEDIC SURGERY

## 2025-06-25 PROCEDURE — 3600000004 HC OR TIME - INITIAL BASE CHARGE - PROCEDURE LEVEL FOUR: Performed by: ORTHOPAEDIC SURGERY

## 2025-06-25 PROCEDURE — 7100000009 HC PHASE TWO TIME - INITIAL BASE CHARGE: Performed by: ORTHOPAEDIC SURGERY

## 2025-06-25 PROCEDURE — 2500000005 HC RX 250 GENERAL PHARMACY W/O HCPCS: Performed by: ANESTHESIOLOGIST ASSISTANT

## 2025-06-25 PROCEDURE — 97161 PT EVAL LOW COMPLEX 20 MIN: CPT | Mod: GP

## 2025-06-25 PROCEDURE — 22840 INSERT SPINE FIXATION DEVICE: CPT | Performed by: ORTHOPAEDIC SURGERY

## 2025-06-25 PROCEDURE — C1713 ANCHOR/SCREW BN/BN,TIS/BN: HCPCS | Performed by: ORTHOPAEDIC SURGERY

## 2025-06-25 PROCEDURE — 99100 ANES PT EXTEME AGE<1 YR&>70: CPT | Performed by: ANESTHESIOLOGY

## 2025-06-25 PROCEDURE — 2720000007 HC OR 272 NO HCPCS: Performed by: ORTHOPAEDIC SURGERY

## 2025-06-25 PROCEDURE — 7100000001 HC RECOVERY ROOM TIME - INITIAL BASE CHARGE: Performed by: ORTHOPAEDIC SURGERY

## 2025-06-25 PROCEDURE — C1821 INTERSPINOUS IMPLANT: HCPCS | Performed by: ORTHOPAEDIC SURGERY

## 2025-06-25 PROCEDURE — 82947 ASSAY GLUCOSE BLOOD QUANT: CPT

## 2025-06-25 PROCEDURE — 1100000001 HC PRIVATE ROOM DAILY

## 2025-06-25 PROCEDURE — A22558 PR ARTHRODESIS ANT INTERBODY MIN DISCECTOMY,LUMBAR: Performed by: ANESTHESIOLOGIST ASSISTANT

## 2025-06-25 PROCEDURE — 22840 INSERT SPINE FIXATION DEVICE: CPT

## 2025-06-25 PROCEDURE — 3700000001 HC GENERAL ANESTHESIA TIME - INITIAL BASE CHARGE: Performed by: ORTHOPAEDIC SURGERY

## 2025-06-25 PROCEDURE — C1889 IMPLANT/INSERT DEVICE, NOC: HCPCS | Performed by: ORTHOPAEDIC SURGERY

## 2025-06-25 PROCEDURE — 2500000005 HC RX 250 GENERAL PHARMACY W/O HCPCS: Performed by: ORTHOPAEDIC SURGERY

## 2025-06-25 PROCEDURE — 2500000004 HC RX 250 GENERAL PHARMACY W/ HCPCS (ALT 636 FOR OP/ED): Mod: JZ | Performed by: ANESTHESIOLOGY

## 2025-06-25 PROCEDURE — 22558 ARTHRD ANT NTRBD MIN DSC LUM: CPT

## 2025-06-25 PROCEDURE — 2500000001 HC RX 250 WO HCPCS SELF ADMINISTERED DRUGS (ALT 637 FOR MEDICARE OP)

## 2025-06-25 PROCEDURE — RXMED WILLOW AMBULATORY MEDICATION CHARGE

## 2025-06-25 PROCEDURE — 2500000005 HC RX 250 GENERAL PHARMACY W/O HCPCS: Performed by: ANESTHESIOLOGY

## 2025-06-25 PROCEDURE — 22853 INSJ BIOMECHANICAL DEVICE: CPT | Performed by: ORTHOPAEDIC SURGERY

## 2025-06-25 PROCEDURE — 9420000001 HC RT PATIENT EDUCATION 5 MIN

## 2025-06-25 PROCEDURE — 0ST20ZZ RESECTION OF LUMBAR VERTEBRAL DISC, OPEN APPROACH: ICD-10-PCS | Performed by: ORTHOPAEDIC SURGERY

## 2025-06-25 PROCEDURE — 2780000003 HC OR 278 NO HCPCS: Performed by: ORTHOPAEDIC SURGERY

## 2025-06-25 PROCEDURE — 7100000010 HC PHASE TWO TIME - EACH INCREMENTAL 1 MINUTE: Performed by: ORTHOPAEDIC SURGERY

## 2025-06-25 PROCEDURE — A22558 PR ARTHRODESIS ANT INTERBODY MIN DISCECTOMY,LUMBAR: Performed by: ANESTHESIOLOGY

## 2025-06-25 PROCEDURE — 0SG00A0 FUSION OF LUMBAR VERTEBRAL JOINT WITH INTERBODY FUSION DEVICE, ANTERIOR APPROACH, ANTERIOR COLUMN, OPEN APPROACH: ICD-10-PCS | Performed by: ORTHOPAEDIC SURGERY

## 2025-06-25 PROCEDURE — 2500000004 HC RX 250 GENERAL PHARMACY W/ HCPCS (ALT 636 FOR OP/ED): Performed by: ANESTHESIOLOGIST ASSISTANT

## 2025-06-25 PROCEDURE — 97530 THERAPEUTIC ACTIVITIES: CPT | Mod: GP

## 2025-06-25 PROCEDURE — 2500000001 HC RX 250 WO HCPCS SELF ADMINISTERED DRUGS (ALT 637 FOR MEDICARE OP): Performed by: ANESTHESIOLOGY

## 2025-06-25 PROCEDURE — 7100000002 HC RECOVERY ROOM TIME - EACH INCREMENTAL 1 MINUTE: Performed by: ORTHOPAEDIC SURGERY

## 2025-06-25 PROCEDURE — 3600000009 HC OR TIME - EACH INCREMENTAL 1 MINUTE - PROCEDURE LEVEL FOUR: Performed by: ORTHOPAEDIC SURGERY

## 2025-06-25 PROCEDURE — 2500000004 HC RX 250 GENERAL PHARMACY W/ HCPCS (ALT 636 FOR OP/ED)

## 2025-06-25 PROCEDURE — 22558 ARTHRD ANT NTRBD MIN DSC LUM: CPT | Performed by: ORTHOPAEDIC SURGERY

## 2025-06-25 PROCEDURE — 22853 INSJ BIOMECHANICAL DEVICE: CPT

## 2025-06-25 DEVICE — IMPLANTABLE DEVICE: Type: IMPLANTABLE DEVICE | Site: SPINE LUMBAR | Status: FUNCTIONAL

## 2025-06-25 DEVICE — BONE CHIPS, CANCELL 15CC 1-4MM: Type: IMPLANTABLE DEVICE | Site: SPINE LUMBAR | Status: FUNCTIONAL

## 2025-06-25 DEVICE — ORTHOBLAST II PUTTY, 5CC - DERIVED FROM SELECTED DONATED HUMAN BONE TISSUE THAT HAS BEEN PROCESSED INTO PARTICLES. THE PARTICLES ARE SUBSEQUENTLY DEMINERALIZED USING A HYDROCHLORIC ACID PROCESS. THE DEMINERALIZED BONE MATRIX (DBM) IS COMBINED WITH A REVERSE PHASE CARRIER, CANCELLOUS CHIPS FROM THE SAME DONOR, AND THEN FORMULATED TO A PASTE OR PUTTY-LIKE CONSISTENCY.
Type: IMPLANTABLE DEVICE | Site: SPINE LUMBAR | Status: FUNCTIONAL
Brand: ORTHOBLAST II PUTTY

## 2025-06-25 DEVICE — SCREW SET, SOLERA VOYAGER, 5.5/6.0: Type: IMPLANTABLE DEVICE | Site: SPINE LUMBAR | Status: FUNCTIONAL

## 2025-06-25 DEVICE — BONE GRAFT KIT 7510200 INFUSE SMALL
Type: IMPLANTABLE DEVICE | Site: SPINE LUMBAR | Status: FUNCTIONAL
Brand: INFUSE® BONE GRAFT

## 2025-06-25 RX ORDER — NORETHINDRONE AND ETHINYL ESTRADIOL 0.5-0.035
KIT ORAL AS NEEDED
Status: DISCONTINUED | OUTPATIENT
Start: 2025-06-25 | End: 2025-06-25

## 2025-06-25 RX ORDER — SODIUM CHLORIDE, SODIUM LACTATE, POTASSIUM CHLORIDE, CALCIUM CHLORIDE 600; 310; 30; 20 MG/100ML; MG/100ML; MG/100ML; MG/100ML
100 INJECTION, SOLUTION INTRAVENOUS CONTINUOUS
Status: ACTIVE | OUTPATIENT
Start: 2025-06-25 | End: 2025-06-25

## 2025-06-25 RX ORDER — ONDANSETRON HYDROCHLORIDE 2 MG/ML
4 INJECTION, SOLUTION INTRAVENOUS EVERY 8 HOURS PRN
Status: DISCONTINUED | OUTPATIENT
Start: 2025-06-25 | End: 2025-06-26 | Stop reason: HOSPADM

## 2025-06-25 RX ORDER — GLYCOPYRROLATE 0.2 MG/ML
INJECTION INTRAMUSCULAR; INTRAVENOUS AS NEEDED
Status: DISCONTINUED | OUTPATIENT
Start: 2025-06-25 | End: 2025-06-25

## 2025-06-25 RX ORDER — MONTELUKAST SODIUM 10 MG/1
10 TABLET ORAL NIGHTLY
Status: DISCONTINUED | OUTPATIENT
Start: 2025-06-25 | End: 2025-06-26 | Stop reason: HOSPADM

## 2025-06-25 RX ORDER — LIDOCAINE HYDROCHLORIDE 20 MG/ML
INJECTION, SOLUTION INFILTRATION; PERINEURAL AS NEEDED
Status: DISCONTINUED | OUTPATIENT
Start: 2025-06-25 | End: 2025-06-25

## 2025-06-25 RX ORDER — FENTANYL CITRATE 50 UG/ML
INJECTION, SOLUTION INTRAMUSCULAR; INTRAVENOUS AS NEEDED
Status: DISCONTINUED | OUTPATIENT
Start: 2025-06-25 | End: 2025-06-25

## 2025-06-25 RX ORDER — KETOROLAC TROMETHAMINE 30 MG/ML
15 INJECTION, SOLUTION INTRAMUSCULAR; INTRAVENOUS EVERY 6 HOURS PRN
Status: DISCONTINUED | OUTPATIENT
Start: 2025-06-25 | End: 2025-06-26 | Stop reason: HOSPADM

## 2025-06-25 RX ORDER — METHOCARBAMOL 100 MG/ML
INJECTION, SOLUTION INTRAMUSCULAR; INTRAVENOUS AS NEEDED
Status: DISCONTINUED | OUTPATIENT
Start: 2025-06-25 | End: 2025-06-25

## 2025-06-25 RX ORDER — ACETAMINOPHEN 325 MG/1
975 TABLET ORAL EVERY 8 HOURS
Status: DISCONTINUED | OUTPATIENT
Start: 2025-06-25 | End: 2025-06-26 | Stop reason: HOSPADM

## 2025-06-25 RX ORDER — ACETAMINOPHEN 500 MG
1000 TABLET ORAL EVERY 6 HOURS PRN
Qty: 56 TABLET | Refills: 0 | Status: SHIPPED | OUTPATIENT
Start: 2025-06-25 | End: 2025-07-03

## 2025-06-25 RX ORDER — BISACODYL 5 MG
10 TABLET, DELAYED RELEASE (ENTERIC COATED) ORAL DAILY PRN
Status: DISCONTINUED | OUTPATIENT
Start: 2025-06-25 | End: 2025-06-26 | Stop reason: HOSPADM

## 2025-06-25 RX ORDER — LIDOCAINE 560 MG/1
PATCH PERCUTANEOUS; TOPICAL; TRANSDERMAL AS NEEDED
Status: DISCONTINUED | OUTPATIENT
Start: 2025-06-25 | End: 2025-06-25 | Stop reason: HOSPADM

## 2025-06-25 RX ORDER — LIDOCAINE HYDROCHLORIDE 10 MG/ML
0.1 INJECTION, SOLUTION EPIDURAL; INFILTRATION; INTRACAUDAL; PERINEURAL ONCE
Status: DISCONTINUED | OUTPATIENT
Start: 2025-06-25 | End: 2025-06-25 | Stop reason: HOSPADM

## 2025-06-25 RX ORDER — OXYCODONE HYDROCHLORIDE 5 MG/1
5 TABLET ORAL EVERY 4 HOURS PRN
Status: DISCONTINUED | OUTPATIENT
Start: 2025-06-25 | End: 2025-06-25 | Stop reason: HOSPADM

## 2025-06-25 RX ORDER — SODIUM CHLORIDE, SODIUM LACTATE, POTASSIUM CHLORIDE, CALCIUM CHLORIDE 600; 310; 30; 20 MG/100ML; MG/100ML; MG/100ML; MG/100ML
100 INJECTION, SOLUTION INTRAVENOUS CONTINUOUS
Status: DISCONTINUED | OUTPATIENT
Start: 2025-06-25 | End: 2025-06-26 | Stop reason: HOSPADM

## 2025-06-25 RX ORDER — MIDAZOLAM HYDROCHLORIDE 1 MG/ML
INJECTION INTRAMUSCULAR; INTRAVENOUS AS NEEDED
Status: DISCONTINUED | OUTPATIENT
Start: 2025-06-25 | End: 2025-06-25

## 2025-06-25 RX ORDER — DIPHENHYDRAMINE HCL 12.5MG/5ML
12.5 LIQUID (ML) ORAL EVERY 6 HOURS PRN
Status: DISCONTINUED | OUTPATIENT
Start: 2025-06-25 | End: 2025-06-26 | Stop reason: HOSPADM

## 2025-06-25 RX ORDER — CEFAZOLIN 1 G/1
INJECTION, POWDER, FOR SOLUTION INTRAVENOUS AS NEEDED
Status: DISCONTINUED | OUTPATIENT
Start: 2025-06-25 | End: 2025-06-25

## 2025-06-25 RX ORDER — CEFAZOLIN SODIUM 2 G/50ML
2 SOLUTION INTRAVENOUS EVERY 8 HOURS
Status: COMPLETED | OUTPATIENT
Start: 2025-06-25 | End: 2025-06-26

## 2025-06-25 RX ORDER — DEXTROSE 50 % IN WATER (D50W) INTRAVENOUS SYRINGE
12.5
Status: DISCONTINUED | OUTPATIENT
Start: 2025-06-25 | End: 2025-06-26 | Stop reason: HOSPADM

## 2025-06-25 RX ORDER — OXYCODONE HYDROCHLORIDE 5 MG/1
5 TABLET ORAL EVERY 4 HOURS PRN
Refills: 0 | Status: DISCONTINUED | OUTPATIENT
Start: 2025-06-25 | End: 2025-06-26 | Stop reason: HOSPADM

## 2025-06-25 RX ORDER — KETOROLAC TROMETHAMINE 10 MG/1
10 TABLET, FILM COATED ORAL EVERY 6 HOURS PRN
Qty: 15 TABLET | Refills: 0 | Status: SHIPPED | OUTPATIENT
Start: 2025-06-25 | End: 2025-06-30

## 2025-06-25 RX ORDER — FLUTICASONE PROPIONATE 50 MCG
2 SPRAY, SUSPENSION (ML) NASAL DAILY
Status: DISCONTINUED | OUTPATIENT
Start: 2025-06-26 | End: 2025-06-26 | Stop reason: HOSPADM

## 2025-06-25 RX ORDER — PROPOFOL 10 MG/ML
INJECTION, EMULSION INTRAVENOUS AS NEEDED
Status: DISCONTINUED | OUTPATIENT
Start: 2025-06-25 | End: 2025-06-25

## 2025-06-25 RX ORDER — OXYCODONE HYDROCHLORIDE 5 MG/1
10 TABLET ORAL EVERY 4 HOURS PRN
Refills: 0 | Status: DISCONTINUED | OUTPATIENT
Start: 2025-06-25 | End: 2025-06-26 | Stop reason: HOSPADM

## 2025-06-25 RX ORDER — ONDANSETRON 4 MG/1
4 TABLET, FILM COATED ORAL EVERY 8 HOURS PRN
Status: DISCONTINUED | OUTPATIENT
Start: 2025-06-25 | End: 2025-06-26 | Stop reason: HOSPADM

## 2025-06-25 RX ORDER — METHOCARBAMOL 750 MG/1
750 TABLET, FILM COATED ORAL EVERY 8 HOURS PRN
Status: DISCONTINUED | OUTPATIENT
Start: 2025-06-25 | End: 2025-06-26 | Stop reason: HOSPADM

## 2025-06-25 RX ORDER — METFORMIN HYDROCHLORIDE 500 MG/1
500 TABLET, EXTENDED RELEASE ORAL
Status: DISCONTINUED | OUTPATIENT
Start: 2025-06-25 | End: 2025-06-26 | Stop reason: HOSPADM

## 2025-06-25 RX ORDER — ONDANSETRON HYDROCHLORIDE 2 MG/ML
INJECTION, SOLUTION INTRAVENOUS AS NEEDED
Status: DISCONTINUED | OUTPATIENT
Start: 2025-06-25 | End: 2025-06-25

## 2025-06-25 RX ORDER — HYDROMORPHONE HYDROCHLORIDE 0.2 MG/ML
0.2 INJECTION INTRAMUSCULAR; INTRAVENOUS; SUBCUTANEOUS EVERY 4 HOURS PRN
Status: DISCONTINUED | OUTPATIENT
Start: 2025-06-25 | End: 2025-06-26 | Stop reason: HOSPADM

## 2025-06-25 RX ORDER — GENTAMICIN 40 MG/ML
INJECTION, SOLUTION INTRAMUSCULAR; INTRAVENOUS AS NEEDED
Status: DISCONTINUED | OUTPATIENT
Start: 2025-06-25 | End: 2025-06-25

## 2025-06-25 RX ORDER — ACETAMINOPHEN 325 MG/1
650 TABLET ORAL EVERY 4 HOURS PRN
Status: DISCONTINUED | OUTPATIENT
Start: 2025-06-25 | End: 2025-06-25 | Stop reason: HOSPADM

## 2025-06-25 RX ORDER — SCOPOLAMINE 1 MG/3D
1 PATCH, EXTENDED RELEASE TRANSDERMAL
Status: DISCONTINUED | OUTPATIENT
Start: 2025-06-25 | End: 2025-06-26 | Stop reason: HOSPADM

## 2025-06-25 RX ORDER — KETOROLAC TROMETHAMINE 30 MG/ML
INJECTION, SOLUTION INTRAMUSCULAR; INTRAVENOUS AS NEEDED
Status: DISCONTINUED | OUTPATIENT
Start: 2025-06-25 | End: 2025-06-25

## 2025-06-25 RX ORDER — PANTOPRAZOLE SODIUM 40 MG/1
40 TABLET, DELAYED RELEASE ORAL
Status: DISCONTINUED | OUTPATIENT
Start: 2025-06-26 | End: 2025-06-26 | Stop reason: HOSPADM

## 2025-06-25 RX ORDER — DOCUSATE SODIUM 100 MG/1
100 CAPSULE, LIQUID FILLED ORAL 3 TIMES DAILY PRN
Qty: 21 CAPSULE | Refills: 0 | Status: SHIPPED | OUTPATIENT
Start: 2025-06-25 | End: 2025-07-03

## 2025-06-25 RX ORDER — INSULIN LISPRO 100 [IU]/ML
0-10 INJECTION, SOLUTION INTRAVENOUS; SUBCUTANEOUS
Status: DISCONTINUED | OUTPATIENT
Start: 2025-06-25 | End: 2025-06-26 | Stop reason: HOSPADM

## 2025-06-25 RX ORDER — METOCLOPRAMIDE HYDROCHLORIDE 5 MG/ML
10 INJECTION INTRAMUSCULAR; INTRAVENOUS ONCE AS NEEDED
Status: DISCONTINUED | OUTPATIENT
Start: 2025-06-25 | End: 2025-06-25 | Stop reason: HOSPADM

## 2025-06-25 RX ORDER — BUPIVACAINE HCL/EPINEPHRINE 0.5-1:200K
VIAL (ML) INJECTION AS NEEDED
Status: DISCONTINUED | OUTPATIENT
Start: 2025-06-25 | End: 2025-06-25 | Stop reason: HOSPADM

## 2025-06-25 RX ORDER — SODIUM CHLORIDE, SODIUM LACTATE, POTASSIUM CHLORIDE, CALCIUM CHLORIDE 600; 310; 30; 20 MG/100ML; MG/100ML; MG/100ML; MG/100ML
INJECTION, SOLUTION INTRAVENOUS CONTINUOUS PRN
Status: DISCONTINUED | OUTPATIENT
Start: 2025-06-25 | End: 2025-06-25

## 2025-06-25 RX ORDER — DEXTROSE 50 % IN WATER (D50W) INTRAVENOUS SYRINGE
25
Status: DISCONTINUED | OUTPATIENT
Start: 2025-06-25 | End: 2025-06-26 | Stop reason: HOSPADM

## 2025-06-25 RX ORDER — OMEPRAZOLE 40 MG/1
40 CAPSULE, DELAYED RELEASE ORAL
Status: DISCONTINUED | OUTPATIENT
Start: 2025-06-26 | End: 2025-06-25 | Stop reason: ALTCHOICE

## 2025-06-25 RX ORDER — PHENYLEPHRINE HYDROCHLORIDE 10 MG/ML
INJECTION INTRAVENOUS AS NEEDED
Status: DISCONTINUED | OUTPATIENT
Start: 2025-06-25 | End: 2025-06-25

## 2025-06-25 RX ORDER — SODIUM CHLORIDE 0.9 G/100ML
INJECTION, SOLUTION IRRIGATION AS NEEDED
Status: DISCONTINUED | OUTPATIENT
Start: 2025-06-25 | End: 2025-06-25 | Stop reason: HOSPADM

## 2025-06-25 RX ORDER — POLYETHYLENE GLYCOL 3350 17 G/17G
17 POWDER, FOR SOLUTION ORAL DAILY
Status: DISCONTINUED | OUTPATIENT
Start: 2025-06-25 | End: 2025-06-26 | Stop reason: HOSPADM

## 2025-06-25 RX ORDER — GABAPENTIN 300 MG/1
300 CAPSULE ORAL 3 TIMES DAILY
Status: DISCONTINUED | OUTPATIENT
Start: 2025-06-25 | End: 2025-06-26 | Stop reason: HOSPADM

## 2025-06-25 RX ORDER — LIDOCAINE HYDROCHLORIDE 40 MG/ML
SOLUTION TOPICAL AS NEEDED
Status: DISCONTINUED | OUTPATIENT
Start: 2025-06-25 | End: 2025-06-25

## 2025-06-25 RX ORDER — ESMOLOL HYDROCHLORIDE 10 MG/ML
INJECTION INTRAVENOUS AS NEEDED
Status: DISCONTINUED | OUTPATIENT
Start: 2025-06-25 | End: 2025-06-25

## 2025-06-25 RX ORDER — DIPHENHYDRAMINE HYDROCHLORIDE 50 MG/ML
12.5 INJECTION, SOLUTION INTRAMUSCULAR; INTRAVENOUS EVERY 6 HOURS PRN
Status: DISCONTINUED | OUTPATIENT
Start: 2025-06-25 | End: 2025-06-26 | Stop reason: HOSPADM

## 2025-06-25 RX ORDER — LISINOPRIL 20 MG/1
20 TABLET ORAL DAILY
Status: DISCONTINUED | OUTPATIENT
Start: 2025-06-26 | End: 2025-06-26 | Stop reason: HOSPADM

## 2025-06-25 RX ORDER — CYCLOBENZAPRINE HCL 10 MG
10 TABLET ORAL 3 TIMES DAILY PRN
Qty: 30 TABLET | Refills: 0 | Status: SHIPPED | OUTPATIENT
Start: 2025-06-25 | End: 2025-07-06

## 2025-06-25 RX ORDER — OXYCODONE HYDROCHLORIDE 5 MG/1
5 TABLET ORAL EVERY 6 HOURS PRN
Qty: 28 TABLET | Refills: 0 | Status: SHIPPED | OUTPATIENT
Start: 2025-06-25 | End: 2025-07-03

## 2025-06-25 RX ORDER — SUCCINYLCHOLINE CHLORIDE 20 MG/ML
INJECTION INTRAMUSCULAR; INTRAVENOUS AS NEEDED
Status: DISCONTINUED | OUTPATIENT
Start: 2025-06-25 | End: 2025-06-25

## 2025-06-25 RX ORDER — NALOXONE HYDROCHLORIDE 1 MG/ML
0.2 INJECTION INTRAMUSCULAR; INTRAVENOUS; SUBCUTANEOUS EVERY 5 MIN PRN
Status: DISCONTINUED | OUTPATIENT
Start: 2025-06-25 | End: 2025-06-26 | Stop reason: HOSPADM

## 2025-06-25 RX ORDER — ROCURONIUM BROMIDE 10 MG/ML
INJECTION, SOLUTION INTRAVENOUS AS NEEDED
Status: DISCONTINUED | OUTPATIENT
Start: 2025-06-25 | End: 2025-06-25

## 2025-06-25 RX ADMIN — MONTELUKAST 10 MG: 10 TABLET, FILM COATED ORAL at 20:17

## 2025-06-25 RX ADMIN — Medication 0.03 MCG/KG/MIN: at 09:48

## 2025-06-25 RX ADMIN — ACETAMINOPHEN 975 MG: 325 TABLET, FILM COATED ORAL at 23:47

## 2025-06-25 RX ADMIN — SODIUM CHLORIDE, SODIUM LACTATE, POTASSIUM CHLORIDE, AND CALCIUM CHLORIDE: .6; .31; .03; .02 INJECTION, SOLUTION INTRAVENOUS at 09:40

## 2025-06-25 RX ADMIN — GLYCOPYRROLATE 0.2 MG: 0.2 INJECTION INTRAMUSCULAR; INTRAVENOUS at 10:08

## 2025-06-25 RX ADMIN — ACETAMINOPHEN 975 MG: 325 TABLET, FILM COATED ORAL at 17:24

## 2025-06-25 RX ADMIN — FENTANYL CITRATE 50 MCG: 50 INJECTION, SOLUTION INTRAMUSCULAR; INTRAVENOUS at 10:12

## 2025-06-25 RX ADMIN — CEFAZOLIN 2 G: 1 INJECTION, POWDER, FOR SOLUTION INTRAMUSCULAR; INTRAVENOUS at 09:45

## 2025-06-25 RX ADMIN — PSYLLIUM HUSK 1 PACKET: 3.4 POWDER ORAL at 17:24

## 2025-06-25 RX ADMIN — DEXAMETHASONE SODIUM PHOSPHATE 8 MG: 4 INJECTION, SOLUTION INTRAMUSCULAR; INTRAVENOUS at 09:45

## 2025-06-25 RX ADMIN — EPHEDRINE SULFATE 10 MG: 50 INJECTION, SOLUTION INTRAVENOUS at 09:53

## 2025-06-25 RX ADMIN — LIDOCAINE HYDROCHLORIDE 100 MG: 20 INJECTION, SOLUTION INFILTRATION; PERINEURAL at 09:45

## 2025-06-25 RX ADMIN — ESMOLOL HYDROCHLORIDE 30 MG: 10 INJECTION, SOLUTION INTRAVENOUS at 11:32

## 2025-06-25 RX ADMIN — FENTANYL CITRATE 50 MCG: 50 INJECTION, SOLUTION INTRAMUSCULAR; INTRAVENOUS at 11:16

## 2025-06-25 RX ADMIN — ONDANSETRON 4 MG: 2 INJECTION, SOLUTION INTRAMUSCULAR; INTRAVENOUS at 11:48

## 2025-06-25 RX ADMIN — KETOROLAC TROMETHAMINE 30 MG: 30 INJECTION, SOLUTION INTRAMUSCULAR at 11:48

## 2025-06-25 RX ADMIN — ESMOLOL HYDROCHLORIDE 20 MG: 10 INJECTION, SOLUTION INTRAVENOUS at 10:26

## 2025-06-25 RX ADMIN — PROPOFOL 25 MCG/KG/MIN: 10 INJECTION, EMULSION INTRAVENOUS at 09:48

## 2025-06-25 RX ADMIN — PHENYLEPHRINE HYDROCHLORIDE 200 MCG: 10 INJECTION INTRAVENOUS at 09:54

## 2025-06-25 RX ADMIN — METFORMIN HYDROCHLORIDE 500 MG: 500 TABLET, EXTENDED RELEASE ORAL at 17:24

## 2025-06-25 RX ADMIN — CEFAZOLIN SODIUM 2 G: 2 SOLUTION INTRAVENOUS at 17:25

## 2025-06-25 RX ADMIN — HYDROMORPHONE HYDROCHLORIDE 0.5 MG: 1 INJECTION, SOLUTION INTRAMUSCULAR; INTRAVENOUS; SUBCUTANEOUS at 12:53

## 2025-06-25 RX ADMIN — GABAPENTIN 300 MG: 300 CAPSULE ORAL at 21:45

## 2025-06-25 RX ADMIN — SODIUM CHLORIDE, SODIUM LACTATE, POTASSIUM CHLORIDE, AND CALCIUM CHLORIDE 100 ML/HR: .6; .31; .03; .02 INJECTION, SOLUTION INTRAVENOUS at 17:25

## 2025-06-25 RX ADMIN — HYDROMORPHONE HYDROCHLORIDE 0.5 MG: 1 INJECTION, SOLUTION INTRAMUSCULAR; INTRAVENOUS; SUBCUTANEOUS at 12:48

## 2025-06-25 RX ADMIN — PHENYLEPHRINE HYDROCHLORIDE 100 MCG: 10 INJECTION INTRAVENOUS at 10:45

## 2025-06-25 RX ADMIN — POLYETHYLENE GLYCOL 3350 17 G: 17 POWDER, FOR SOLUTION ORAL at 17:25

## 2025-06-25 RX ADMIN — SODIUM CHLORIDE: 9 INJECTION, SOLUTION INTRAVENOUS at 09:45

## 2025-06-25 RX ADMIN — SUCCINYLCHOLINE CHLORIDE 100 MG: 20 INJECTION, SOLUTION INTRAMUSCULAR; INTRAVENOUS at 09:45

## 2025-06-25 RX ADMIN — PROPOFOL 200 MG: 10 INJECTION, EMULSION INTRAVENOUS at 09:45

## 2025-06-25 RX ADMIN — GABAPENTIN 300 MG: 300 CAPSULE ORAL at 17:24

## 2025-06-25 RX ADMIN — OXYCODONE HYDROCHLORIDE 5 MG: 5 TABLET ORAL at 13:34

## 2025-06-25 RX ADMIN — ROCURONIUM BROMIDE 20 MG: 10 INJECTION, SOLUTION INTRAVENOUS at 11:16

## 2025-06-25 RX ADMIN — HYDROMORPHONE HYDROCHLORIDE 0.5 MG: 1 INJECTION, SOLUTION INTRAMUSCULAR; INTRAVENOUS; SUBCUTANEOUS at 13:22

## 2025-06-25 RX ADMIN — METHOCARBAMOL 1000 MG: 1000 INJECTION, SOLUTION INTRAMUSCULAR; INTRAVENOUS at 11:48

## 2025-06-25 RX ADMIN — SUGAMMADEX 200 MG: 100 INJECTION, SOLUTION INTRAVENOUS at 12:07

## 2025-06-25 RX ADMIN — FENTANYL CITRATE 100 MCG: 50 INJECTION, SOLUTION INTRAMUSCULAR; INTRAVENOUS at 09:45

## 2025-06-25 RX ADMIN — MIDAZOLAM HYDROCHLORIDE 2 MG: 1 INJECTION, SOLUTION INTRAMUSCULAR; INTRAVENOUS at 09:40

## 2025-06-25 RX ADMIN — ROCURONIUM BROMIDE 60 MG: 10 INJECTION, SOLUTION INTRAVENOUS at 10:38

## 2025-06-25 RX ADMIN — LIDOCAINE HYDROCHLORIDE 4 ML: 40 SOLUTION TOPICAL at 09:47

## 2025-06-25 RX ADMIN — ESMOLOL HYDROCHLORIDE 30 MG: 10 INJECTION, SOLUTION INTRAVENOUS at 12:00

## 2025-06-25 RX ADMIN — GENTAMICIN SULFATE 120 MG: 40 INJECTION, SOLUTION INTRAMUSCULAR; INTRAVENOUS at 09:45

## 2025-06-25 RX ADMIN — ESMOLOL HYDROCHLORIDE 20 MG: 10 INJECTION, SOLUTION INTRAVENOUS at 11:18

## 2025-06-25 RX ADMIN — Medication 10 L/MIN: at 12:17

## 2025-06-25 RX ADMIN — CARBOXYMETHYLCELLULOSE SODIUM 4 DROP: 5 SOLUTION/ DROPS OPHTHALMIC at 09:45

## 2025-06-25 RX ADMIN — SODIUM CHLORIDE, SODIUM LACTATE, POTASSIUM CHLORIDE, AND CALCIUM CHLORIDE 100 ML/HR: .6; .31; .03; .02 INJECTION, SOLUTION INTRAVENOUS at 13:00

## 2025-06-25 SDOH — ECONOMIC STABILITY: INCOME INSECURITY: IN THE PAST 12 MONTHS HAS THE ELECTRIC, GAS, OIL, OR WATER COMPANY THREATENED TO SHUT OFF SERVICES IN YOUR HOME?: NO

## 2025-06-25 SDOH — SOCIAL STABILITY: SOCIAL INSECURITY: WITHIN THE LAST YEAR, HAVE YOU BEEN HUMILIATED OR EMOTIONALLY ABUSED IN OTHER WAYS BY YOUR PARTNER OR EX-PARTNER?: NO

## 2025-06-25 SDOH — ECONOMIC STABILITY: FOOD INSECURITY: HOW HARD IS IT FOR YOU TO PAY FOR THE VERY BASICS LIKE FOOD, HOUSING, MEDICAL CARE, AND HEATING?: NOT HARD AT ALL

## 2025-06-25 SDOH — ECONOMIC STABILITY: FOOD INSECURITY: WITHIN THE PAST 12 MONTHS, YOU WORRIED THAT YOUR FOOD WOULD RUN OUT BEFORE YOU GOT THE MONEY TO BUY MORE.: NEVER TRUE

## 2025-06-25 SDOH — ECONOMIC STABILITY: HOUSING INSECURITY: AT ANY TIME IN THE PAST 12 MONTHS, WERE YOU HOMELESS OR LIVING IN A SHELTER (INCLUDING NOW)?: NO

## 2025-06-25 SDOH — SOCIAL STABILITY: SOCIAL INSECURITY: DOES ANYONE TRY TO KEEP YOU FROM HAVING/CONTACTING OTHER FRIENDS OR DOING THINGS OUTSIDE YOUR HOME?: NO

## 2025-06-25 SDOH — ECONOMIC STABILITY: FOOD INSECURITY: WITHIN THE PAST 12 MONTHS, THE FOOD YOU BOUGHT JUST DIDN'T LAST AND YOU DIDN'T HAVE MONEY TO GET MORE.: NEVER TRUE

## 2025-06-25 SDOH — SOCIAL STABILITY: SOCIAL INSECURITY: WITHIN THE LAST YEAR, HAVE YOU BEEN AFRAID OF YOUR PARTNER OR EX-PARTNER?: NO

## 2025-06-25 SDOH — HEALTH STABILITY: MENTAL HEALTH: CURRENT SMOKER: 0

## 2025-06-25 SDOH — SOCIAL STABILITY: SOCIAL INSECURITY
WITHIN THE LAST YEAR, HAVE YOU BEEN KICKED, HIT, SLAPPED, OR OTHERWISE PHYSICALLY HURT BY YOUR PARTNER OR EX-PARTNER?: NO

## 2025-06-25 SDOH — ECONOMIC STABILITY: HOUSING INSECURITY: IN THE LAST 12 MONTHS, WAS THERE A TIME WHEN YOU WERE NOT ABLE TO PAY THE MORTGAGE OR RENT ON TIME?: NO

## 2025-06-25 SDOH — SOCIAL STABILITY: SOCIAL INSECURITY
WITHIN THE LAST YEAR, HAVE YOU BEEN RAPED OR FORCED TO HAVE ANY KIND OF SEXUAL ACTIVITY BY YOUR PARTNER OR EX-PARTNER?: NO

## 2025-06-25 SDOH — ECONOMIC STABILITY: TRANSPORTATION INSECURITY: IN THE PAST 12 MONTHS, HAS LACK OF TRANSPORTATION KEPT YOU FROM MEDICAL APPOINTMENTS OR FROM GETTING MEDICATIONS?: NO

## 2025-06-25 SDOH — SOCIAL STABILITY: SOCIAL INSECURITY: HAS ANYONE EVER THREATENED TO HURT YOUR FAMILY OR YOUR PETS?: NO

## 2025-06-25 SDOH — SOCIAL STABILITY: SOCIAL INSECURITY: ARE THERE ANY APPARENT SIGNS OF INJURIES/BEHAVIORS THAT COULD BE RELATED TO ABUSE/NEGLECT?: NO

## 2025-06-25 SDOH — ECONOMIC STABILITY: HOUSING INSECURITY: IN THE PAST 12 MONTHS, HOW MANY TIMES HAVE YOU MOVED WHERE YOU WERE LIVING?: 1

## 2025-06-25 SDOH — SOCIAL STABILITY: SOCIAL INSECURITY: HAVE YOU HAD THOUGHTS OF HARMING ANYONE ELSE?: NO

## 2025-06-25 SDOH — SOCIAL STABILITY: SOCIAL INSECURITY: ABUSE: ADULT

## 2025-06-25 SDOH — SOCIAL STABILITY: SOCIAL INSECURITY: DO YOU FEEL ANYONE HAS EXPLOITED OR TAKEN ADVANTAGE OF YOU FINANCIALLY OR OF YOUR PERSONAL PROPERTY?: NO

## 2025-06-25 SDOH — SOCIAL STABILITY: SOCIAL INSECURITY: DO YOU FEEL UNSAFE GOING BACK TO THE PLACE WHERE YOU ARE LIVING?: NO

## 2025-06-25 SDOH — SOCIAL STABILITY: SOCIAL INSECURITY: ARE YOU OR HAVE YOU BEEN THREATENED OR ABUSED PHYSICALLY, EMOTIONALLY, OR SEXUALLY BY ANYONE?: NO

## 2025-06-25 SDOH — SOCIAL STABILITY: SOCIAL INSECURITY: WERE YOU ABLE TO COMPLETE ALL THE BEHAVIORAL HEALTH SCREENINGS?: YES

## 2025-06-25 ASSESSMENT — ACTIVITIES OF DAILY LIVING (ADL)
PATIENT'S MEMORY ADEQUATE TO SAFELY COMPLETE DAILY ACTIVITIES?: YES
BATHING: INDEPENDENT
ADEQUATE_TO_COMPLETE_ADL: YES
GROOMING: INDEPENDENT
JUDGMENT_ADEQUATE_SAFELY_COMPLETE_DAILY_ACTIVITIES: YES
HEARING - RIGHT EAR: FUNCTIONAL
FEEDING YOURSELF: INDEPENDENT
BATHING: INDEPENDENT
HEARING - RIGHT EAR: FUNCTIONAL
GROOMING: INDEPENDENT
ADL_ASSISTANCE: INDEPENDENT
FEEDING YOURSELF: INDEPENDENT
PATIENT'S MEMORY ADEQUATE TO SAFELY COMPLETE DAILY ACTIVITIES?: YES
TOILETING: INDEPENDENT
HEARING - LEFT EAR: FUNCTIONAL
DRESSING YOURSELF: INDEPENDENT
ADEQUATE_TO_COMPLETE_ADL: YES
DRESSING YOURSELF: INDEPENDENT
WALKS IN HOME: INDEPENDENT
WALKS IN HOME: INDEPENDENT
LACK_OF_TRANSPORTATION: NO
TOILETING: INDEPENDENT
JUDGMENT_ADEQUATE_SAFELY_COMPLETE_DAILY_ACTIVITIES: YES
HEARING - LEFT EAR: FUNCTIONAL

## 2025-06-25 ASSESSMENT — COGNITIVE AND FUNCTIONAL STATUS - GENERAL
CLIMB 3 TO 5 STEPS WITH RAILING: A LITTLE
TURNING FROM BACK TO SIDE WHILE IN FLAT BAD: A LITTLE
MOBILITY SCORE: 15
DRESSING REGULAR LOWER BODY CLOTHING: A LITTLE
STANDING UP FROM CHAIR USING ARMS: A LITTLE
DRESSING REGULAR UPPER BODY CLOTHING: A LITTLE
MOVING FROM LYING ON BACK TO SITTING ON SIDE OF FLAT BED WITH BEDRAILS: A LITTLE
MOVING TO AND FROM BED TO CHAIR: A LITTLE
DAILY ACTIVITIY SCORE: 22
MOBILITY SCORE: 20
PATIENT BASELINE BEDBOUND: YES
STANDING UP FROM CHAIR USING ARMS: A LITTLE
MOBILITY SCORE: 18
STANDING UP FROM CHAIR USING ARMS: A LITTLE
DAILY ACTIVITIY SCORE: 22
TOILETING: A LITTLE
WALKING IN HOSPITAL ROOM: A LOT
CLIMB 3 TO 5 STEPS WITH RAILING: A LOT
WALKING IN HOSPITAL ROOM: A LITTLE
WALKING IN HOSPITAL ROOM: A LITTLE
CLIMB 3 TO 5 STEPS WITH RAILING: TOTAL
MOVING TO AND FROM BED TO CHAIR: A LITTLE
HELP NEEDED FOR BATHING: A LITTLE
MOVING FROM LYING ON BACK TO SITTING ON SIDE OF FLAT BED WITH BEDRAILS: A LITTLE
TURNING FROM BACK TO SIDE WHILE IN FLAT BAD: A LITTLE

## 2025-06-25 ASSESSMENT — COLUMBIA-SUICIDE SEVERITY RATING SCALE - C-SSRS
2. HAVE YOU ACTUALLY HAD ANY THOUGHTS OF KILLING YOURSELF?: NO
1. IN THE PAST MONTH, HAVE YOU WISHED YOU WERE DEAD OR WISHED YOU COULD GO TO SLEEP AND NOT WAKE UP?: NO
6. HAVE YOU EVER DONE ANYTHING, STARTED TO DO ANYTHING, OR PREPARED TO DO ANYTHING TO END YOUR LIFE?: NO

## 2025-06-25 ASSESSMENT — PAIN SCALES - GENERAL
PAINLEVEL_OUTOF10: 7
PAINLEVEL_OUTOF10: 6
PAINLEVEL_OUTOF10: 4
PAINLEVEL_OUTOF10: 6
PAINLEVEL_OUTOF10: 6
PAINLEVEL_OUTOF10: 8
PAINLEVEL_OUTOF10: 6
PAINLEVEL_OUTOF10: 8
PAINLEVEL_OUTOF10: 4
PAINLEVEL_OUTOF10: 5 - MODERATE PAIN
PAINLEVEL_OUTOF10: 7
PAINLEVEL_OUTOF10: 6
PAINLEVEL_OUTOF10: 6
PAINLEVEL_OUTOF10: 4
PAINLEVEL_OUTOF10: 5 - MODERATE PAIN
PAINLEVEL_OUTOF10: 8
PAINLEVEL_OUTOF10: 8
PAINLEVEL_OUTOF10: 4

## 2025-06-25 ASSESSMENT — PAIN DESCRIPTION - LOCATION: LOCATION: BACK

## 2025-06-25 ASSESSMENT — PAIN DESCRIPTION - DESCRIPTORS
DESCRIPTORS: SORE
DESCRIPTORS: SORE;THROBBING
DESCRIPTORS: SORE
DESCRIPTORS: THROBBING;SORE
DESCRIPTORS: SORE;THROBBING

## 2025-06-25 ASSESSMENT — PAIN - FUNCTIONAL ASSESSMENT

## 2025-06-25 ASSESSMENT — LIFESTYLE VARIABLES
HOW OFTEN DO YOU HAVE 6 OR MORE DRINKS ON ONE OCCASION: NEVER
SKIP TO QUESTIONS 9-10: 1
AUDIT-C TOTAL SCORE: 0
HOW MANY STANDARD DRINKS CONTAINING ALCOHOL DO YOU HAVE ON A TYPICAL DAY: PATIENT DOES NOT DRINK
HOW OFTEN DO YOU HAVE A DRINK CONTAINING ALCOHOL: NEVER
AUDIT-C TOTAL SCORE: 0

## 2025-06-25 ASSESSMENT — PATIENT HEALTH QUESTIONNAIRE - PHQ9
SUM OF ALL RESPONSES TO PHQ9 QUESTIONS 1 & 2: 0
1. LITTLE INTEREST OR PLEASURE IN DOING THINGS: NOT AT ALL
2. FEELING DOWN, DEPRESSED OR HOPELESS: NOT AT ALL

## 2025-06-25 NOTE — ANESTHESIA PROCEDURE NOTES
Peripheral IV  Date/Time: 6/25/2025 9:50 AM  Inserted by: FATOUMATA Valdez    Placement  Needle size: 20 G  Laterality: left  Location: hand  Local anesthetic: none  Site prep: alcohol  Technique: anatomical landmarks  Attempts: 1

## 2025-06-25 NOTE — PROGRESS NOTES
Physical Therapy    Physical Therapy Evaluation & Treatment    Patient Name: Jessica Turner  MRN: [id number]  Department: [facility] PACU  Room: [facility] OR  Today's Date: 6/25/2025   Time Calculation  Start Time: 1435  Stop Time: 1513  Time Calculation (min): 38 min    Assessment/Plan   PT Assessment  PT Assessment Results: Decreased strength, Decreased range of motion, Decreased endurance, Impaired balance, Decreased mobility, Pain, Orthopedic restrictions  Rehab Prognosis: Good  Barriers to Discharge Home: No anticipated barriers  End of Session Communication: Bedside nurse  Assessment Comment: PT Evaluation Completed. The patient presented with decreased mobility, balance, endurance, safety awareness, and increased pain and fatigue. These impairments are negatively impacting her ability to perform at baseline functional level, in home environment. The patient is at risk of falls & injury. This patient would benefit from skilled therapy intervention to address limitations and progress towards the PT goals.  Anticipate low frequency PT needs at discharge  End of Session Patient Position: Up in chair, Alarm off, not on at start of session (seen in PACU)   IP OR SWING BED PT PLAN  Inpatient or Swing Bed: Inpatient  PT Plan  Treatment/Interventions: Bed mobility, Transfer training, Gait training, Stair training, Balance training, Strengthening, Endurance training, Range of motion, Therapeutic exercise, Therapeutic activity, Home exercise program  PT Plan: Ongoing PT  PT Frequency: Daily  PT Discharge Recommendations: Low intensity level of continued care  PT Recommended Transfer Status: Assist x1  PT - OK to Discharge: Yes (PT POC established.)      Subjective     PT Visit Info:  PT Received On: 06/25/25  General Visit Information:  General  Reason for Referral: L4-L5 anterior lumbar interbody fusion with peek interbody cage, infuse bone morphogenic protein, allograft chips and demineralized bone matrix through a  minimally invasive direct lateral retroperitoneal approach; percutaneous pedicle screw instrumentation L4-L5  Referred By: Fady Bravo PA-C  Past Medical History Relevant to Rehab: Medical History[1]    Prior to Session Communication: Bedside nurse  Patient Position Received: On cart, Alarm off, not on at start of session (seen in PACU)  General Comment: Pt pleasant and agreeable to PT eval. Pt's  present for session.  Home Living:  Home Living  Type of Home: House  Lives With: Spouse  Home Adaptive Equipment: Walker rolling or standard, Cane  Home Layout: Two level, Stairs to alternate level with rails, Bed/bath upstairs  Alternate Level Stairs-Rails: Right  Alternate Level Stairs-Number of Steps: 6  Home Access: Stairs to enter with rails  Entrance Stairs-Rails: Right  Entrance Stairs-Number of Steps: 2  Bathroom Shower/Tub: Tub/shower unit  Prior Level of Function:  Prior Function Per Pt/Caregiver Report  Level of [place]: Independent with ADLs and functional transfers, Independent with homemaking with ambulation  Receives Help From:  (pt's  will be available to assist)  ADL Assistance: Independent  Homemaking Assistance: Independent (cannot maintain standing for long periods of time due to back pain. Requires rest breaks while completing household tasks)  Ambulatory Assistance: Independent (cane used rarely)  Prior Function Comments: Denies any falls in the past 6 months.  Precautions:  Precautions  Medical Precautions: Fall precautions  Post-Surgical Precautions: Spinal precautions     Date/Time Vitals Session Patient Position Pulse Resp SpO2 BP MAP (mmHg)    25 1345 --  --  105  16  100 %  93/51  59     25 1400 --  --  99  16  100 %  96/52  62     25 1415 --  --  90  16  100 %  109/84  98     25 1416 --  --  99  16  100 %  109/84  98     25 1435 --  Lying  --  --  --  120/84  --           Vital Signs Comment: Standin/51, pt reporting only mild  dizziness.    Objective   Pain:  Pain Assessment  Pain Assessment: 0-10  0-10 (Numeric) Pain Score: 4  Pain Location: Back  Pain Orientation: Right, Mid  Cognition:  Cognition  Overall Cognitive Status: Within Functional Limits  Orientation Level: Oriented X4    General Assessments:  Activity Tolerance  Endurance: Tolerates 30+ min exercise without fatigue    Sensation  Light Touch:  (Mild R foot numbness)         Postural Control  Postural Control: Within Functional Limits    Static Sitting Balance  Static Sitting-Balance Support: Feet supported, Bilateral upper extremity supported  Static Sitting-Level of Assistance: Close supervision  Dynamic Sitting Balance  Dynamic Sitting-Balance Support: Feet supported, Bilateral upper extremity supported  Dynamic Sitting-Level of Assistance: Close supervision    Static Standing Balance  Static Standing-Balance Support: Bilateral upper extremity supported  Static Standing-Level of Assistance: Contact guard  Dynamic Standing Balance  Dynamic Standing-Balance Support: Bilateral upper extremity supported  Dynamic Standing-Level of Assistance: Contact guard, Maximum assistance  Functional Assessments:  Bed Mobility  Bed Mobility: Yes  Bed Mobility 1  Bed Mobility 1: Supine to sitting  Level of Assistance 1: Minimum assistance  Bed Mobility Comments 1: Step by step cues for log roll technique. Pt requires assist to maneuver trunk into sitting. Increased time and effort to complete.    Transfers  Transfer: Yes  Transfer 1  Technique 1: Sit to stand, Stand to sit  Transfer Device 1: Walker  Transfer Level of Assistance 1: Minimum assistance  Trials/Comments 1: Cues for hand placement.  Transfers 2  Technique 2: Stand pivot  Transfer Device 2: Walker  Transfer Level of Assistance 2: Minimum assistance  Trials/Comments 2: Mutliple stand pivot transfers performed to get from WC to bed/chair. cues for sequencing and walker placement. Pt continues to have difficulty maneuvering  LLE.    Ambulation/Gait Training  Ambulation/Gait Training Performed: Yes  Ambulation/Gait Training 1  Surface 1: Level tile  Device 1: Rolling walker  Assistance 1: Contact guard  Comments/Distance (ft) 1: 30 ft. Pt ambulates with decreased daysi and step length. Decreased WB on the LLE due to pain. Pt had 2 episodes of LE buckling, min A to recover first episode, max A to recover second episode with patient assisted to a chair. Further ambulation deferred due to safety concerns.  Extremity/Trunk Assessments:  RUE   RUE : Within Functional Limits  LUE   LUE: Within Functional Limits  RLE   RLE :  (Strength >3/5 based on observation of functional mobility. ROM WFL.)  LLE   LLE :  (Strength grossly 3-/5, ROM WFL.)  Treatments:  Therapeutic Activity  Therapeutic Activity Performed: Yes  Therapeutic Activity 1: Pt educated on spinal precautions and log roll technique with step by step cues. Pt completes 5-6 trials of sit <> stand transfers with repeated cueing for hand placement, scooting to the EOB, anterior weight shifting, and backing up fully prior to sitting. Facilitated safe gait training with cues for walker placement, sequencing, and body mechanics. Pt education on POC and expected therapy progression.  Outcome Measures:  [facility] Basic Mobility  Turning from your back to your side while in a flat bed without using bedrails: A little  Moving from lying on your back to sitting on the side of a flat bed without using bedrails: A little  Moving to and from bed to chair (including a wheelchair): A little  Standing up from a chair using your arms (e.g. wheelchair or bedside chair): A little  To walk in hospital room: A lot  Climbing 3-5 steps with railing: Total  Basic Mobility - Total Score: 15    Encounter Problems       Encounter Problems (Active)       Balance       complete all mobility with normal balance while dual tasking, negotiating in a dynamic environment, carrying items, etc., with proactive and reactive  static and dynamic standing and sitting tasks, with mod I and LRAD, >15 minutes.         Start:  06/25/25    Expected End:  07/09/25               Mobility       STG - Patient will ambulate 150 ft mod I with LRAD.        Start:  06/25/25    Expected End:  07/09/25            STG - Patient will ascend and descend six stairs mod I with unilateral HR and LRAD       Start:  06/25/25    Expected End:  07/09/25               PT Transfers       STG - Patient will perform bed mobility independently using log roll technique.        Start:  06/25/25    Expected End:  07/09/25            STG - Patient will transfer sit to and from stand mod I with LRAD       Start:  06/25/25    Expected End:  07/09/25               Safety       LTG - Patient will adhere to spinal precautions during ADL's and transfers independently.        Start:  06/25/25    Expected End:  07/09/25                   Education Documentation  Handouts, taught by Sendy Jones, PT at 6/25/2025  3:36 PM.  Learner: Patient  Readiness: Acceptance  Method: Explanation, Handout  Response: Verbalizes Understanding    Body Mechanics, taught by Sendy Jones PT at 6/25/2025  3:36 PM.  Learner: Patient  Readiness: Acceptance  Method: Explanation, Handout  Response: Verbalizes Understanding    Precautions, taught by Sendy Jones PT at 6/25/2025  3:36 PM.  Learner: Patient  Readiness: Acceptance  Method: Explanation, Handout  Response: Verbalizes Understanding    Mobility Training, taught by Sendy Jones, PT at 6/25/2025  3:36 PM.  Learner: Patient  Readiness: Acceptance  Method: Explanation, Handout  Response: Verbalizes Understanding    Education Comments  No comments found.           [1]   Past Medical History:  Diagnosis Date    Arthritis     CKD (chronic kidney disease)     creat 0.9, BUN 22, GFR 68 - 5/1/25    GERD (gastroesophageal reflux disease)     HL (hearing loss)     Hyperparathyroidism (Multi)     following with endo - Ca 10.4- 5/1/25 - pt was  instructed to stop ca supplement and recheck level in Aug    Hypertension     Hypomagnesemia     started on oral supplement - 1.2 on 5/1/25    Prediabetes     7/9/24: A1C 6.2%    Sleep apnea     BiPAP    Spinal stenosis, lumbar region with neurogenic claudication

## 2025-06-25 NOTE — OP NOTE
"L4-L5 Anterior Lumbar Spine Fusion and Posterior Instrumentation Operative Note     Date: 2025  OR Location: [facility] A OR    Name: Gabby Turner \"Jessica\", : 1955, Age: 69 y.o., MRN: [id number], Sex: female    Diagnosis  Pre-op Diagnosis      * Lumbar stenosis with neurogenic claudication [M48.062]     * Lumbar radiculopathy [M54.16]     * Spondylolisthesis [M43.10] Post-op Diagnosis     * Lumbar stenosis with neurogenic claudication [M48.062]     * Lumbar radiculopathy [M54.16]     * Spondylolisthesis [M43.10]     Procedures  L4-L5 anterior lumbar interbody fusion with peek interbody cage, infuse bone morphogenic protein, allograft chips and demineralized bone matrix through a minimally invasive direct lateral retroperitoneal approach; percutaneous pedicle screw instrumentation L4-L5 with Medtronic Voyager instrumentation    Surgeons      * Connor Means - Primary    Resident/Fellow/Other Assistant:  Surgeons and Role:     * Fady Bravo PA-C - Assisting    Staff:   Circulator: Cari Delacruzub Person: Igor Britton Circulator: Lucas Birtton Scrub: Lucas    Anesthesia Staff: Anesthesiologist: Rudi Davis MD  C-AA: FATOUMATA Valdez; FATOUMATA Velazquez    Procedure Summary  Anesthesia: General  ASA: II  Estimated Blood Loss: 5mL  Intra-op Medications:   Administrations occurring from 0845 to 1225 on 25:   Medication Name Total Dose   thrombin-bovine (JMI) 5,000 unit topical solution 10,000 Units   sodium chloride 0.9 % irrigation solution 1,000 mL   lidocaine 4 % patch 1 patch   ceFAZolin (Ancef) vial 1 g 2 g   dexAMETHasone (Decadron) 4 mg/mL IV Syringe 2 mL 8 mg   ePHEDrine injection 10 mg   esmolol 10 mg/mL 70 mg   fentaNYL (Sublimaze) injection 50 mcg/mL 200 mcg   gentamicin (Garamycin) injection 40 mg/mL 120 mg   glycopyrrolate (Robinul) injection 0.2 mg   LR infusion Cannot be calculated   lidocaine (Xylocaine) 2 % 100 mg   lidocaine (LTA Kit) for intubation 4 mL   lubricating eye drops " "ophthalmic solution 4 drop   midazolam PF (Versed) injection 1 mg/mL 2 mg   phenylephrine (Rosalio-Synephrine) injection 300 mcg   propofol (Diprivan) injection 10 mg/mL 693.83 mg   remifentanil (Ultiva) 2,000 mcg in sodium chloride 0.9% 50 mL (40 mcg/mL) infusion 0.52 mg   rocuronium (ZeMuron) 50 mg/5 mL injection 80 mg   NaCl 0.9 % bolus Cannot be calculated   succinylcholine 20 mg/mL VIAL 100 mg              Anesthesia Record               Intraprocedure I/O Totals          Intake    NaCl 0.9 % bolus 300.00 mL    Remifentanil Drip 0.00 mL    The total shown is the total volume documented since Anesthesia Start was filed.    LR infusion 200.00 mL    Total Intake 500 mL       Output    Urine 0 mL    Total Output 0 mL       Net    Net Volume 500 mL          Specimen: No specimens collected              Drains and/or Catheters: * None in log *    Tourniquet Times:         Implants:  Implants       Type Name Action Serial No.      Graft INFUSE BMP SMALL - [id number] Implanted      Spinal Hardware PUTTY, ORTHOBLAST II 5ML - [id number] - [id number] Implanted [id number]     Graft BONE CHIPS, CANCELL 15CC 1-4MM - [id number] - [id number] Implanted [id number]     Spinal Hardware COHERTE XLW Implanted      Spinal Hardware SCREW SET, SOLERA VOYAGER, 5.5/6.0 - [id number] Implanted      Screw SCREW Implanted      Rani RANI Implanted                   Indications: Gabby Turnre \"Marcin" is an 69 y.o. female who is having surgery for Lumbar stenosis with neurogenic claudication [M48.062]  Lumbar radiculopathy [M54.16]  Spondylolisthesis [M43.10].     The patient was seen in the preoperative area. The risks, benefits, complications, treatment options, non-operative alternatives, expected recovery and outcomes were discussed with the patient. The possibilities of reaction to medication, pulmonary aspiration, injury to surrounding structures, bleeding, recurrent infection, the need for additional procedures, failure to diagnose a " condition, and creating a complication requiring transfusion or operation were discussed with the patient. The patient concurred with the proposed plan, giving informed consent.  The site of surgery was properly noted/marked if necessary per policy. The patient has been actively warmed in preoperative area. Preoperative antibiotics have been ordered and given within 1 hours of incision. Venous thrombosis prophylaxis have been ordered including bilateral sequential compression devices    Procedure Details: Patient was taken to the operating room where a formal timeout was done according to hospital protocol.  Patient was intubated without difficulty.  Patient was given preoperative antibiotics.  Patient was positioned in lateral position left side down right side up.  Body was secured bed was flexed right lateral flank was prepped and draped in usual fashion.  Muscle relaxation was withheld for EMG monitoring.  C-arm fluoroscopy was used to identify the L4-5 disc space.  A small bleak incision was made.  Under direct visualization the EMG monitoring probe was advanced through the psoas muscle on the lateral aspect the L4-5 disc.  There were acceptable nerve readings.  The retractor was placed.  We then did a complete discectomy.  We then dilated open the disc space until we are satisfied with a 12 x 50 x 22 mm peek interbody cage packed with a small infuse sponge, allograft chips and demineralized bone matrix.  X-ray confirmed satisfactory placement of the cage.  Floseal was placed.  There is good hemostasis.  The retractor was withdrawn.  Soft tissues look normal.  Wound was closed in usual fashion.  Sterile dressing was applied.  Attention was then turned to flipping the patient prone on the Fady table.  Lumbar spine was prepped and draped.  Percutaneous pedicle screws were then placed bilaterally at L4 and L5.  Submuscular aleja was passed.  Screws were tightened to the  specification.  X-ray  confirmed satisfactory placement.  Wounds were closed in usual fashion.  Sterile dressing was applied.    I was present and scrubbed for the entire procedure.  The physician assistant was present, scrubbed and participated in all portions of the procedure, as no qualified surgeon physician and/or resident surgeon was available to assist in the case.      Connor Means MD    Chief of Spine Surgery, [facility]  Director of Spine Service, [facility]  , Department of Orthopaedics  [employer] [facility] [employer]  [address] [place] [address]  P: [phone]    This note was dictated with voice recognition software.  It has not been proofread for grammatical errors, typographical mistakes or other semantic inconsistencies.      Evidence of Infection: No   Complications:  None; patient tolerated the procedure well.    Disposition: PACU - hemodynamically stable.  Condition: stable                     Attending Attestation:     Connor Means  Phone Number: [phone]

## 2025-06-25 NOTE — ANESTHESIA PREPROCEDURE EVALUATION
"Patient: Gabby Turner \"Jessica\"    Procedure Information       Date/Time: 06/25/25 0863    Procedure: L4-L5 Anterior Lumbar Spine Fusion and Posterior Instrumentation (Spine Lumbar)    Location: [facility] A OR 07 / Virtual [facility] A OR    Surgeons: Connor Means MD            Relevant Problems   No relevant active problems       Clinical information reviewed:   Tobacco  Allergies  Meds   Med Hx  Surg Hx   Fam Hx  Soc Hx         Medical History[1]   Surgical History[2]  Social History[3]   Current Outpatient Medications   Medication Instructions    acetaminophen (TYLENOL 8 HOUR) 650 mg, Every 8 hours PRN    aspirin 81 mg, Daily    benzonatate (TESSALON) 200 mg, Every 8 hours PRN    chlorhexidine (Peridex) 0.12 % solution Swish for 30 seconds and spit 15mL of solution the night before and morning of surgery    cholecalciferol (VITAMIN D-3) 1,000 Units, Daily RT    fluticasone (Flonase) 50 mcg/actuation nasal spray 2 sprays, Daily    gabapentin (NEURONTIN) 300 mg, 3 times daily    ibuprofen 800 mg, oral, 3 times daily after meals    lisinopril 20 mg, Daily    magnesium oxide (MAG-OX) 400 mg, Daily    metFORMIN XR (GLUCOPHAGE-XR) 500 mg, 2 times daily (morning and late afternoon)    montelukast (Singulair) 10 mg tablet 1 tablet, Nightly    omeprazole (PRILOSEC) 40 mg, Daily before breakfast    semaglutide 2.5 mg/mL solution Once Weekly    traMADol (ULTRAM) 50 mg, 2 times daily      RX Allergies[4]     Chemistry    Lab Results   Component Value Date/Time     06/19/2025 1028    K 4.3 06/19/2025 1028     06/19/2025 1028    CO2 22 06/19/2025 1028    BUN 18 06/19/2025 1028    CREATININE 0.87 06/19/2025 1028    Lab Results   Component Value Date/Time    CALCIUM 10.3 06/19/2025 1028          Lab Results   Component Value Date    HGBA1C 5.2 06/19/2025     Lab Results   Component Value Date/Time    WBC 8.7 06/19/2025 1028    HGB 13.3 06/19/2025 1028    HCT 41.9 06/19/2025 1028     06/19/2025 1028    ABO O " 2025 1028    LABRH POS 2025 1028    ABSCRN NEG 2025 1028     Lab Results   Component Value Date/Time    PROTIME 10.6 2025 1028    INR 1.0 2025 1028     No results found for this or any previous visit (from the past 4464 hours).   No results found for this or any previous visit from the past 1095 days.        Visit Vitals  OB Status Postmenopausal   Smoking Status Never     No data recorded    Physical Exam    Airway  Mallampati: II  TM distance: >3 FB  Neck ROM: full  Mouth opening: 3 or more finger widths     Cardiovascular - normal exam   Dental - normal exam     Pulmonary - normal exam   Abdominal - normal exam(+) obese             Anesthesia Plan    History of general anesthesia?: yes  History of complications of general anesthesia?: no    ASA 2     The patient is not a current smoker.    intravenous induction   Postoperative pain plan includes opioids.  Anesthetic plan and risks discussed with patient.  Use of blood products discussed with patient who.    Plan discussed with attending and CAA.             [1]   Past Medical History:  Diagnosis Date    Arthritis     CKD (chronic kidney disease)     creat 0.9, BUN 22, GFR 68 - 25    GERD (gastroesophageal reflux disease)     HL (hearing loss)     Hyperparathyroidism (Multi)     following with endo - Ca 10.4- 25 - pt was instructed to stop ca supplement and recheck level in Aug    Hypertension     Hypomagnesemia     started on oral supplement - 1.2 on 25    Prediabetes     24: A1C 6.2%    Sleep apnea     BiPAP    Spinal stenosis, lumbar region with neurogenic claudication    [2]   Past Surgical History:  Procedure Laterality Date    BUNIONECTOMY Left     CARPAL TUNNEL RELEASE Bilateral     CATARACT EXTRACTION W/  INTRAOCULAR LENS IMPLANT Bilateral      SECTION, CLASSIC      x 2    CHOLECYSTECTOMY      COLONOSCOPY      TONSILLECTOMY N/A     TOTAL KNEE ARTHROPLASTY Right     TOTAL KNEE ARTHROPLASTY Left  2023   [3]   Social History  Tobacco Use    Smoking status: Never    Smokeless tobacco: Never   Vaping Use    Vaping status: Never Used   Substance Use Topics    Alcohol use: Never    Drug use: Never   [4]   Allergies  Allergen Reactions    Sulfa (Sulfonamide Antibiotics) Rash

## 2025-06-25 NOTE — ANESTHESIA POSTPROCEDURE EVALUATION
"Patient: Gabby Turner \"Jessica\"    Procedure Summary       Date: 06/25/25 Room / [facility]: [facility] 07 / [facility]    Anesthesia Start: 0940 Anesthesia Stop: 1226    Procedure: L4-L5 Anterior Lumbar Spine Fusion and Posterior Instrumentation (Spine Lumbar) Diagnosis:       Lumbar stenosis with neurogenic claudication      Lumbar radiculopathy      Spondylolisthesis      (Lumbar stenosis with neurogenic claudication [M48.062])      (Lumbar radiculopathy [M54.16])      (Spondylolisthesis [M43.10])    Surgeons: Connor Means MD Responsible Provider: Rudi Davis MD    Anesthesia Type: general ASA Status: 2            Anesthesia Type: general    Vitals Value Taken Time   BP 88/51 06/25/25 13:03   Temp 36.1 °C (97 °F) 06/25/25 12:55   Pulse 103 06/25/25 13:03   Resp 16 06/25/25 12:55   SpO2 100 % 06/25/25 13:03   Vitals shown include unfiled device data.    Anesthesia Post Evaluation    Patient location during evaluation: PACU  Patient participation: complete - patient participated  Level of consciousness: awake  Pain management: adequate  Airway patency: patent  Cardiovascular status: acceptable  Respiratory status: acceptable  Hydration status: acceptable  Postoperative Nausea and Vomiting: none        No notable events documented.    "

## 2025-06-25 NOTE — DISCHARGE INSTRUCTIONS
Dr. Means' Discharge Instructions:    Dr. Connor Means' Office  [place] - : [phone]  [place] Voicemail ([facility] [employer]): [phone]  *Please leave Name, , & call back number  Lumbar Fusion    REMEMBER:  ACTIVTY:  Move regularly. Avoid staying in any one position longer than 1-2 hours, ambulate/walk frequently while awake. This will help with stiffness.  May sleep in any position that is most comfortable, in a bed or recliner.     No NSAIDs (Advil, Aleve, Motrin, ibuprofen, naproxen, diclofenac, meloxicam, Celebrex, etc) for 3 months following fusion surgery, except if prescribed one week of Ketorolac.      RESTRICTIONS:  Do not drive for at least 24 hours after surgery or while taking narcotics (Percocet/oxycodone, hydrocodone, tramadol, etc) pain medication.    No pushing, pulling, or lifting of objects greater than 5-10 pounds for 3 weeks. A gallon of milk is 8 pounds for reference. Then may lift up to 15 pounds.   No extreme bending, twisting, or turning of low back. May move as needed for activities of daily living.       BLOOD THINNERS:  May restart 3-5 days after surgery if taking blood thinners (Coumadin, warfarin, Lovenox, etc), or as directed by prescribing provider.   Continue aspirin daily without restriction.      WOUND CARE:  Do not shower for 48 hours following surgery.   Keep surgical incision clean and dry until shower. Change with non-adherent dressing daily and as needed.  If no drainage, may cover or leave uncovered based on personal preference.   Do not remove Steri-Strips they will fall off on their own.   Any nylon sutures will be removed by provider at follow up visit.     Remove lidocaine patch after 12 hours.         DIET:  Continue on clear liquid diet until passing gas.  Then may resume regular diet.   Continue Magnesium Citrate until bowel movement.   If you do not have a bowel movement in 72 hours with magnesium citrate, go to the Emergency Department.      REMEMBER:   It is normal to have post-surgical back pain/spasms, even with small incisions.   Ice and/or heat may be helpful.    It is normal to have coming and going nerve pain in the legs as the nerve heals.   Nerve pain may be replaced initially with numbness and tingling/ pins and needles.    Week to week, post-surgical pain should become less often and less intense.   Continue medication as prescribed.      CALL PHYSICIAN:   Signs of infection at incision site:   progressive drainage or an unusual odor  increased redness and or swelling  increased pain and or tenderness    Excessive Bleeding:  Slow general oozing that completely soaks dressing  Fresh bright red bleeding or bleeding that will not stop.   It is normal to have a couple of days of drainage, but continues beyond 5 days of surgery.     EMERGENCY:  If you do not urinate in 8-10 hours, go to the Emergency Department.      FOR PRESCRIPTION REFILLS GIVE 48 HOURS NOTICE.    Follow-up 2-3 weeks from surgery for radiographs and suture removal.

## 2025-06-25 NOTE — INTERVAL H&P NOTE
H&P reviewed. The patient was examined and there are no changes to the H&P.   


 


Darren 





    [id number] [facility] Rx#:[id number]   


 


  [facility]


   Stop: 10/28/18 08:59


   Last Admin: 18 08:37 Dose:  500 mg


Aspirin (Aspirin Chewable)  162 mg PO DAILY [facility]


   Stop: 10/28/18 08:59


   Last Admin: 18 08:39 Dose:  162 mg


Atorvastatin Calcium (Lipitor)  20 mg PO HS [facility]; Protocol


   Stop: 10/27/[facility]


   Stop: 10/31/[facility]


   Stop: 10/27/18 16:59


   Last Admin: 18 08:37 Dose:  60 mg


Gabapentin (Neurontin)  800 mg PO TID [facility]


   Stop: 10/27/[facility]; Protocol


   Stop: 10/28/18 20:44


   Last Titration: 18 10:40 Dose:  0 mcg/kg/min, 0 mls/hr


Ipratropium Bromide (Atrovent Neb 0.5mg/2.5ml)  0.5 mg HHN Q8HRT [facility]


   Stop: 18 14:59


   Last Admin: 18 07:39 Dose:  0.5 mg


Lactobacillus Rhamnosus (Culturelle 15b)  1 each PO DAILY [facility]


   Stop: 18 13:59


   Last Admin: 18 08:39 Dose:  1 each


Lorazepam (Ativan)  1 mg PO BID [facility]; Protocol


   Stop: 10/27/[facility]


   [facility]


   Stop: 10/29/18 12:59


   Last Admin: 18 04:58 Dose:  10 mg


Metoprolol Succinate (Toprol Xl)  25 mg PO BID [facility]


   Stop: 10/27/[facility]; Protocol


   Stop: 10/27/18 16:59


   Last Admin: 18 08:39 Dose:  150 mg


Senna (Senna)  17.2 mg PO HS [facility]


   Stop: 10/27/HEN  (Rec: 18 17:09  [facility]  [id number]


      Ideal Body Weight                          148


      Body Mass Index (BMI)                      19.7


      Weight Status                              Approriate


     GI Symptoms


      GI Symptoms                                None


      Last BM                                    


      Difficult in:                              None


      Skin Integrity/Comment:                    laceration to abdomen


     Estimated Nutritional Goals


      BEE in Kcals:                              Using Current wt


      Calories/Kcals/Kg                          30-35


      Kcals Calculated                           5067-2530


      Protein:                                   Using Current wt


      Protein g/k.2


      Protein Calculated                         66


      Fluid: ml                                  1710-1995ml (9/3


     Expected Outcomes/Goals


      Expected Outcomes/Goals                    1. PO intake to meet at least


                                                 75% of nutritional needs.


                                                 2. Wt stability, skin


                                                 integrity to improve, GI


                                                 function to improve, labs to


                                                 approach WNL.

## 2025-06-26 ENCOUNTER — DOCUMENTATION (OUTPATIENT)
Dept: HOME HEALTH SERVICES | Facility: HOME HEALTH | Age: 70
End: 2025-06-26
Payer: MEDICARE

## 2025-06-26 ENCOUNTER — PHARMACY VISIT (OUTPATIENT)
Dept: PHARMACY | Facility: CLINIC | Age: 70
End: 2025-06-26
Payer: COMMERCIAL

## 2025-06-26 VITALS
HEART RATE: 69 BPM | TEMPERATURE: 97.7 F | RESPIRATION RATE: 16 BRPM | HEIGHT: 58 IN | OXYGEN SATURATION: 99 % | WEIGHT: 185.63 LBS | BODY MASS INDEX: 38.97 KG/M2 | DIASTOLIC BLOOD PRESSURE: 70 MMHG | SYSTOLIC BLOOD PRESSURE: 101 MMHG

## 2025-06-26 LAB
ANION GAP SERPL CALC-SCNC: 14 MMOL/L (ref 10–20)
BUN SERPL-MCNC: 16 MG/DL (ref 6–23)
CALCIUM SERPL-MCNC: 9.1 MG/DL (ref 8.6–10.3)
CHLORIDE SERPL-SCNC: 104 MMOL/L (ref 98–107)
CO2 SERPL-SCNC: 21 MMOL/L (ref 21–32)
CREAT SERPL-MCNC: 1.08 MG/DL (ref 0.5–1.05)
EGFRCR SERPLBLD CKD-EPI 2021: 55 ML/MIN/1.73M*2
ERYTHROCYTE [DISTWIDTH] IN BLOOD BY AUTOMATED COUNT: 13.6 % (ref 11.5–14.5)
GLUCOSE BLD MANUAL STRIP-MCNC: 101 MG/DL (ref 74–99)
GLUCOSE BLD MANUAL STRIP-MCNC: 53 MG/DL (ref 74–99)
GLUCOSE BLD MANUAL STRIP-MCNC: 58 MG/DL (ref 74–99)
GLUCOSE BLD MANUAL STRIP-MCNC: 65 MG/DL (ref 74–99)
GLUCOSE BLD MANUAL STRIP-MCNC: 83 MG/DL (ref 74–99)
GLUCOSE SERPL-MCNC: 108 MG/DL (ref 74–99)
HCT VFR BLD AUTO: 30.6 % (ref 36–46)
HGB BLD-MCNC: 10.7 G/DL (ref 12–16)
MCH RBC QN AUTO: 30.5 PG (ref 26–34)
MCHC RBC AUTO-ENTMCNC: 35 G/DL (ref 32–36)
MCV RBC AUTO: 87 FL (ref 80–100)
NRBC BLD-RTO: 0 /100 WBCS (ref 0–0)
PLATELET # BLD AUTO: 208 X10*3/UL (ref 150–450)
POTASSIUM SERPL-SCNC: 4.2 MMOL/L (ref 3.5–5.3)
RBC # BLD AUTO: 3.51 X10*6/UL (ref 4–5.2)
SODIUM SERPL-SCNC: 135 MMOL/L (ref 136–145)
WBC # BLD AUTO: 10.2 X10*3/UL (ref 4.4–11.3)

## 2025-06-26 PROCEDURE — 85027 COMPLETE CBC AUTOMATED: CPT

## 2025-06-26 PROCEDURE — 2500000002 HC RX 250 W HCPCS SELF ADMINISTERED DRUGS (ALT 637 FOR MEDICARE OP, ALT 636 FOR OP/ED)

## 2025-06-26 PROCEDURE — 97116 GAIT TRAINING THERAPY: CPT | Mod: GP,CQ

## 2025-06-26 PROCEDURE — 36415 COLL VENOUS BLD VENIPUNCTURE: CPT

## 2025-06-26 PROCEDURE — 2500000005 HC RX 250 GENERAL PHARMACY W/O HCPCS

## 2025-06-26 PROCEDURE — 80048 BASIC METABOLIC PNL TOTAL CA: CPT

## 2025-06-26 PROCEDURE — 82947 ASSAY GLUCOSE BLOOD QUANT: CPT

## 2025-06-26 PROCEDURE — 2500000004 HC RX 250 GENERAL PHARMACY W/ HCPCS (ALT 636 FOR OP/ED)

## 2025-06-26 PROCEDURE — 2500000001 HC RX 250 WO HCPCS SELF ADMINISTERED DRUGS (ALT 637 FOR MEDICARE OP): Performed by: PHARMACIST

## 2025-06-26 PROCEDURE — 97535 SELF CARE MNGMENT TRAINING: CPT | Mod: GO

## 2025-06-26 PROCEDURE — 2500000001 HC RX 250 WO HCPCS SELF ADMINISTERED DRUGS (ALT 637 FOR MEDICARE OP)

## 2025-06-26 PROCEDURE — 99024 POSTOP FOLLOW-UP VISIT: CPT

## 2025-06-26 PROCEDURE — 97165 OT EVAL LOW COMPLEX 30 MIN: CPT | Mod: GO

## 2025-06-26 RX ADMIN — CEFAZOLIN SODIUM 2 G: 2 SOLUTION INTRAVENOUS at 01:07

## 2025-06-26 RX ADMIN — POLYETHYLENE GLYCOL 3350 17 G: 17 POWDER, FOR SOLUTION ORAL at 10:35

## 2025-06-26 RX ADMIN — PANTOPRAZOLE SODIUM 40 MG: 40 TABLET, DELAYED RELEASE ORAL at 06:21

## 2025-06-26 RX ADMIN — GABAPENTIN 300 MG: 300 CAPSULE ORAL at 16:03

## 2025-06-26 RX ADMIN — FLUTICASONE PROPIONATE 2 SPRAY: 50 SPRAY, METERED NASAL at 10:37

## 2025-06-26 RX ADMIN — GABAPENTIN 300 MG: 300 CAPSULE ORAL at 10:35

## 2025-06-26 RX ADMIN — METFORMIN HYDROCHLORIDE 500 MG: 500 TABLET, EXTENDED RELEASE ORAL at 10:33

## 2025-06-26 RX ADMIN — DEXTROSE MONOHYDRATE 12.5 G: 25 INJECTION, SOLUTION INTRAVENOUS at 12:40

## 2025-06-26 RX ADMIN — ACETAMINOPHEN 975 MG: 325 TABLET, FILM COATED ORAL at 10:34

## 2025-06-26 RX ADMIN — DEXTROSE MONOHYDRATE 12.5 G: 25 INJECTION, SOLUTION INTRAVENOUS at 14:10

## 2025-06-26 RX ADMIN — KETOROLAC TROMETHAMINE 15 MG: 30 INJECTION, SOLUTION INTRAMUSCULAR at 05:15

## 2025-06-26 RX ADMIN — PSYLLIUM HUSK 1 PACKET: 3.4 POWDER ORAL at 10:36

## 2025-06-26 ASSESSMENT — PAIN SCALES - GENERAL
PAINLEVEL_OUTOF10: 7
PAINLEVEL_OUTOF10: 4
PAINLEVEL_OUTOF10: 5 - MODERATE PAIN
PAINLEVEL_OUTOF10: 6
PAINLEVEL_OUTOF10: 6

## 2025-06-26 ASSESSMENT — COGNITIVE AND FUNCTIONAL STATUS - GENERAL
MOVING TO AND FROM BED TO CHAIR: A LITTLE
CLIMB 3 TO 5 STEPS WITH RAILING: A LITTLE
HELP NEEDED FOR BATHING: A LITTLE
DAILY ACTIVITIY SCORE: 19
DRESSING REGULAR UPPER BODY CLOTHING: A LITTLE
WALKING IN HOSPITAL ROOM: A LITTLE
MOVING FROM LYING ON BACK TO SITTING ON SIDE OF FLAT BED WITH BEDRAILS: A LITTLE
DRESSING REGULAR UPPER BODY CLOTHING: A LITTLE
STANDING UP FROM CHAIR USING ARMS: A LITTLE
DRESSING REGULAR LOWER BODY CLOTHING: A LOT
TOILETING: A LITTLE
TOILETING: A LITTLE
CLIMB 3 TO 5 STEPS WITH RAILING: A LOT
DAILY ACTIVITIY SCORE: 22
MOBILITY SCORE: 18
TURNING FROM BACK TO SIDE WHILE IN FLAT BAD: A LITTLE
MOBILITY SCORE: 20
WALKING IN HOSPITAL ROOM: A LITTLE
STANDING UP FROM CHAIR USING ARMS: A LITTLE

## 2025-06-26 ASSESSMENT — PAIN DESCRIPTION - DESCRIPTORS
DESCRIPTORS: ACHING;DISCOMFORT
DESCRIPTORS: ACHING;DISCOMFORT

## 2025-06-26 ASSESSMENT — PAIN - FUNCTIONAL ASSESSMENT
PAIN_FUNCTIONAL_ASSESSMENT: 0-10

## 2025-06-26 ASSESSMENT — PAIN DESCRIPTION - ORIENTATION: ORIENTATION: MID

## 2025-06-26 ASSESSMENT — PAIN DESCRIPTION - LOCATION
LOCATION: BACK
LOCATION: BACK

## 2025-06-26 ASSESSMENT — ACTIVITIES OF DAILY LIVING (ADL)
HOME_MANAGEMENT_TIME_ENTRY: 23
ADL_ASSISTANCE: INDEPENDENT
LACK_OF_TRANSPORTATION: NO

## 2025-06-26 NOTE — PROGRESS NOTES
"Physical Therapy    Physical Therapy Treatment    Patient Name: Gabby Turner \"Marcin"  MRN: [id number]  Department: [place] [address]  Room: [address] [place] [address]  Today's Date: 6/26/2025  Time Calculation  Start Time: 0910  Stop Time: 0935  Time Calculation (min): 25 min         Assessment/Plan   PT Assessment  Rehab Prognosis: Good  Barriers to Discharge Home: No anticipated barriers  End of Session Communication: Bedside nurse  Assessment Comment: pt able to walk and complete stair training  End of Session Patient Position: Alarm on, Up in chair  PT Plan  Inpatient/Swing Bed or Outpatient: Inpatient  PT Plan  Treatment/Interventions: Transfer training, Gait training, Stair training  PT Plan: Ongoing PT  PT Frequency: Daily  PT Discharge Recommendations: Low intensity level of continued care  PT Recommended Transfer Status: Assist x1  PT - OK to Discharge: Yes (per POC)    PT Visit Info:  PT Received On: 06/26/25     General Visit Information:   General  Reason for Referral: L4-L5 anterior lumbar interbody fusion with peek interbody cage, infuse bone morphogenic protein, allograft chips and demineralized bone matrix through a minimally invasive direct lateral retroperitoneal approach; percutaneous pedicle screw instrumentation L4-L5  Referred By: Fady Bravo PA-C  Prior to Session Communication: Bedside nurse  Patient Position Received: Up in chair, Alarm off, not on at start of session  Preferred Learning Style: auditory, verbal  General Comment: pt agreeable to tx, RN and ANM notifieid of pt being up in chair with no alarm at start of tx.  alarm placed under pt at end of tx    Subjective   Precautions:  Precautions  Medical Precautions: Fall precautions  Post-Surgical Precautions: Spinal precautions          Objective   Pain:  Pain Assessment  0-10 (Numeric) Pain Score: 6  Pain Type: Surgical pain  Pain Location: Back  Pain Orientation: Lower  Pain Interventions: Medication (See MAR) (given prior to " tx)  Cognition:  Cognition  Overall Cognitive Status: Within Functional Limits  Coordination:     Postural Control:  Static Standing Balance  Static Standing-Comment/Number of Minutes: pt performed static standing balance with UE support at RW and  SBA,  x4 min         Ambulation/Gait Training  Ambulation/Gait Training Performed: Yes  Ambulation/Gait Training 1  Surface 1: Level tile  Device 1: Rolling walker  Gait Support Devices: Gait belt  Assistance 1: Contact guard, Close supervision  Comments/Distance (ft) 1: 70x1, 450x1 (pt performed with step through gait pattern.  VC for forward gaze.  no overt LOB. slow and steady)  Transfer 1  Technique 1: Sit to stand, Stand to sit  Transfer Device 1: Walker  Transfer Level of Assistance 1: Close supervision  Trials/Comments 1: vc/tc for hand placment on solid sitting surface and not RW.    Stairs  Stairs: Yes (pt able to complete 4 steps with 1rail and SPC.  CGA. VC for AD placement and BLE sequence.  no overt LOB.  pt completed slowly.)    Outcome Measures:  [facility] Basic Mobility  Turning from your back to your side while in a flat bed without using bedrails: A little  Moving from lying on your back to sitting on the side of a flat bed without using bedrails: A little  Moving to and from bed to chair (including a wheelchair): A little  Standing up from a chair using your arms (e.g. wheelchair or bedside chair): A little  To walk in hospital room: A little  Climbing 3-5 steps with railing: A little  Basic Mobility - Total Score: 18    Education Documentation  Body Mechanics, taught by Ciro Callahan PTA at 6/26/2025  1:42 PM.  Learner: Patient  Readiness: Acceptance  Method: Explanation  Response: Verbalizes Understanding    Mobility Training, taught by Ciro Callahan PTA at 6/26/2025  1:42 PM.  Learner: Patient  Readiness: Acceptance  Method: Explanation  Response: Verbalizes Understanding    Education Comments  No comments found.        OP EDUCATION:        Encounter Problems       Encounter Problems (Active)       Balance       complete all mobility with normal balance while dual tasking, negotiating in a dynamic environment, carrying items, etc., with proactive and reactive static and dynamic standing and sitting tasks, with mod I and LRAD, >15 minutes.         Start:  06/25/25    Expected End:  07/09/25               Mobility       STG - Patient will ambulate 150 ft mod I with LRAD.  (Progressing)       Start:  06/25/25    Expected End:  07/09/25            STG - Patient will ascend and descend six stairs mod I with unilateral HR and LRAD (Progressing)       Start:  06/25/25    Expected End:  07/09/25               PT Transfers       STG - Patient will perform bed mobility independently using log roll technique.        Start:  06/25/25    Expected End:  07/09/25            STG - Patient will transfer sit to and from stand mod I with LRAD (Progressing)       Start:  06/25/25    Expected End:  07/09/25               Safety       LTG - Patient will adhere to spinal precautions during ADL's and transfers independently.  (Progressing)       Start:  06/25/25    Expected End:  07/09/25

## 2025-06-26 NOTE — PROGRESS NOTES
"Gabby Turner \"Jessica\" is a 69 y.o. female on day 1 of admission presenting with Lumbar spine pain.    Subjective   Patient is doing well this morning.  She was out of bed several times to the bathroom overnight.  Preoperative left leg pain has resolved.  Slight weakness in right hip flexion which is anticipated from the surgical approach.  Otherwise, no concerns.       Objective     Physical Exam    Last Recorded Vitals  Blood pressure 109/67, pulse 64, temperature 37.4 °C (99.3 °F), resp. rate 16, height 1.473 m (4' 10\"), weight 84.2 kg (185 lb 10 oz), SpO2 97%.  Intake/Output last 3 Shifts:  I/O last 3 completed shifts:  In: 2573.3 (30.6 mL/kg) [P.O.:200; I.V.:1273.3 (15.1 mL/kg); IV Piggyback:1100]  Out: 75 (0.9 mL/kg) [Blood:75]  Weight: 84.2 kg     Relevant Results      Scheduled medications  Scheduled Medications[1]  Continuous medications  Continuous Medications[2]  PRN medications  PRN Medications[3]  Results for orders placed or performed during the hospital encounter of 06/25/25 (from the past 24 hours)   POCT GLUCOSE   Result Value Ref Range    POCT Glucose 145 (H) 74 - 99 mg/dL   POCT GLUCOSE   Result Value Ref Range    POCT Glucose 174 (H) 74 - 99 mg/dL   CBC   Result Value Ref Range    WBC 10.2 4.4 - 11.3 x10*3/uL    nRBC 0.0 0.0 - 0.0 /100 WBCs    RBC 3.51 (L) 4.00 - 5.20 x10*6/uL    Hemoglobin 10.7 (L) 12.0 - 16.0 g/dL    Hematocrit 30.6 (L) 36.0 - 46.0 %    MCV 87 80 - 100 fL    MCH 30.5 26.0 - 34.0 pg    MCHC 35.0 32.0 - 36.0 g/dL    RDW 13.6 11.5 - 14.5 %    Platelets 208 150 - 450 x10*3/uL                            Assessment & Plan  Lumbar spine pain    Postoperative day 1 status post L4-5 anterior lumbar fusion  Out of bed this morning with physical therapy  Discharge home later today with home care  Follow-up in 2 to 3 weeks              Connor Means MD           [1] acetaminophen, 975 mg, oral, q8h  fluticasone, 2 spray, Each Nostril, Daily  gabapentin, 300 mg, oral, TID  insulin " lispro, 0-10 Units, subcutaneous, TID AC  lisinopril, 20 mg, oral, Daily  metFORMIN XR, 500 mg, oral, BID  montelukast, 10 mg, oral, Nightly  pantoprazole, 40 mg, oral, Daily before breakfast  polyethylene glycol, 17 g, oral, Daily  psyllium, 1 packet, oral, Daily  scopolamine, 1 patch, transdermal, q72h    [2] lactated Ringer's, 100 mL/hr, Last Rate: Stopped (06/26/25 0109)    [3] PRN medications: bisacodyl, dextrose, dextrose, diphenhydrAMINE **OR** diphenhydrAMINE, glucagon, glucagon, HYDROmorphone, ketorolac, methocarbamol, naloxone, ondansetron **OR** ondansetron, oxyCODONE, oxyCODONE, oxygen

## 2025-06-26 NOTE — HH CARE COORDINATION
Home Care received a referral for Physical Therapy and Occupational Therapy. Unfortunately, we are unable to accept and process the referral at this time.    Reason:  Inability to accommodate the patient's needs in a safe and timely manner due to staffing constraints.     Patients, please reach out to the referring provider or your PCP to assist in obtaining an alternative home care agency and/or guidance to meet your needs.    Providers, please reach out to  Home Care at [phone] with any questions regarding the declined referral.

## 2025-06-26 NOTE — PROGRESS NOTES
06/26/25 0946   [facility] Disability Status   Are you deaf or do you have serious difficulty hearing? N   Are you blind or do you have serious difficulty seeing, even when wearing glasses? N   Because of a physical, mental, or emotional condition, do you have serious difficulty concentrating, remembering, or making decisions? (5 years old or older) N   Do you have serious difficulty walking or climbing stairs? N   Do you have serious difficulty dressing or bathing? N   Because of a physical, mental, or emotional condition, do you have serious difficulty doing errands alone such as visiting the doctor? N

## 2025-06-26 NOTE — NURSING NOTE
Interdisciplinary team present: NP, PT, NM, CC, SW, Orthopedic Coordinator, and bedside RN.  Pain - controlled  Nausea - none  Discharge barrier - one more PT session, OT eval  Discharge plan - home with [facility]  Discharge date/time -  later today

## 2025-06-26 NOTE — PROGRESS NOTES
"Occupational Therapy    Evaluation/Treatment    Patient Name: Gabby Turner \"Jessica\"  MRN: [id number]  Department: [facility] [facility] [address]  Room: [address] [place] [address]  Today's Date: 06/26/25  Time Calculation  Start Time: 0945  Stop Time: 1018  Time Calculation (min): 33 min       Assessment:  OT Assessment: 69 y.o. F admitted for lumabr spine pain s/p L4-L5 anterior lumbar interbody fusion with peek interbody cage, infuse bone morphogenic protein, allograft chips and demineralized bone matrix through a minimally invasive direct lateral retroperitoneal approach; percutaneous pedicle screw instrumentation L4-L5  Prognosis: Excellent  Barriers to Discharge Home: No anticipated barriers  Evaluation/Treatment Tolerance: Patient tolerated treatment well  Medical Staff Made Aware: Yes  End of Session Communication: Bedside nurse  End of Session Patient Position: Up in chair, Alarm off, not on at start of session  OT Assessment Results: Decreased ADL status, Decreased functional mobility  Prognosis: Excellent  Barriers to Discharge: None  Evaluation/Treatment Tolerance: Patient tolerated treatment well  Medical Staff Made Aware: Yes  Strengths: Ability to acquire knowledge, Access to adaptive/assistive products, Coping skills, Insight into problems, Premorbid level of function, Support and attitude of living partners  Barriers to Participation:  (none)  Plan:  Treatment Interventions: ADL retraining, Functional transfer training, Endurance training  No Skilled OT: Safe to return home  OT Frequency: OT eval only  OT Discharge Recommendations: No OT needed after discharge  Equipment Recommended upon Discharge: Wheeled walker  OT Recommended Transfer Status: Stand by assist  OT - OK to Discharge: Yes (Per POC)  Treatment Interventions: ADL retraining, Functional transfer training, Endurance training    Subjective   Current Problem:  1. Lumbar spine pain  FL less than 1 hour    FL less than 1 hour      2. Radiculopathy of lumbar region  acetaminophen " (Tylenol) 500 mg tablet    cyclobenzaprine (Flexeril) 10 mg tablet    docusate sodium (Colace) 100 mg capsule    oxyCODONE (Roxicodone) 5 mg immediate release tablet    ketorolac (Toradol) 10 mg tablet    Referral to Home Health      3. Lumbar stenosis with neurogenic claudication        4. Lumbar radiculopathy        5. Spondylolisthesis of lumbar region  Referral to Home Health        OT Visit Info:  OT Received On: 06/26/25  General Visit Info:   General  Reason for Referral: 69 y.o. F admitted for L4-L5 anterior lumbar interbody fusion with peek interbody cage, infuse bone morphogenic protein, allograft chips and demineralized bone matrix through a minimally invasive direct lateral retroperitoneal approach; percutaneous pedicle screw instrumentation L4-L5  Referred By: Fady Bravo PA-C  Past Medical History Relevant to Rehab: Medical History[1]  Surgical History[2]    Family/Caregiver Present: Yes  Caregiver Feedback: Pt. spouse present (Tez), asked appropriate questions  Prior to Session Communication: Bedside nurse  Patient Position Received: Up in chair, Alarm on  Preferred Learning Style: verbal, kinesthetic, auditory  General Comment: Pt. cleared for OT session. Pt. received sitting up in chair, Hep locked.   Precautions:  Hearing/Visual Limitations: WFL  Medical Precautions: Fall precautions  Post-Surgical Precautions: Spinal precautions    Pain:  Pain Assessment  Pain Assessment: 0-10  0-10 (Numeric) Pain Score: 7  Pain Type: Surgical pain  Pain Location: Back  Pain Orientation: Lower  Pain Descriptors: Aching, Discomfort  Pain Frequency: Constant/continuous  Pain Onset: Ongoing  Clinical Progression: Gradually improving  Pain Interventions: Repositioned, Ambulation/increased activity  Response to Interventions: Content/relaxed    Objective   Cognition:  Overall Cognitive Status: Within Functional Limits  Orientation Level: Oriented X4  Attention: Within Functional Limits  Memory: Within Funtional  Limits  Problem Solving: Within Functional Limits  Numeric Reasoning: Within Functional Limits  Abstract Reasoning: Within Functional Limits  Safety/Judgement: Within Functional Limits  Insight: Within function limits  Impulsive: Within functional limits           Home Living:  Type of Home: House (split level home)  Lives With: Spouse  Home Adaptive Equipment: Walker rolling or standard, Cane, Reacher, Long-handled shoehorn  Home Layout: Two level, Stairs to alternate level with rails, Bed/bath upstairs  Alternate Level Stairs-Rails: Right (Pt. has 6 steps down with  R rail going up.)  Alternate Level Stairs-Number of Steps: 6  Home Access: Stairs to enter with rails  Entrance Stairs-Rails: Right  Entrance Stairs-Number of Steps: 2  Bathroom Shower/Tub: Tub/shower unit, Walk-in shower (Pt. has walkin shower on lower lever.)  Bathroom Toilet: Handicapped height  Bathroom Equipment: Shower chair without back  Prior Function:  Level of [place]: Independent with ADLs and functional transfers, Independent with homemaking with ambulation  Receives Help From: Family  ADL Assistance: Independent  Homemaking Assistance: Independent (cannot maintain standing for long periods of time due to back pain. Requires rest breaks while completing household tasks)  Ambulatory Assistance: Independent (rarely uses cane)  Vocational: Part time employment  Leisure: Shopping, traveling spending time with cousin  Hand Dominance: Right  Prior Function Comments: Denies any falls in the past 6 months.  IADL History:  Homemaking Responsibilities: Yes  Current License: Yes  Mode of Transportation: Car  Occupation: Part time employment  Type of Occupation: ; 3 days a week  Leisure and Hobbies: shopping, traveling and spending time with cousin  ADL:  Grooming Assistance: Stand by  Grooming Deficit: Wash/dry hands, Teeth care, Supervision/safety  UE Dressing Assistance: Stand by  UE Dressing Deficit: Supervision/safety  LE  Dressing Assistance: Minimal  LE Dressing Deficit: Thread LLE into underwear, Thread RLE into underwear, Thread LLE into pants, Thread RLE into pants, Don/doff L sock, Don/doff R sock, Supervision/safety  Functional Assistance: Stand by  Activities of Daily Living: Grooming  Grooming Level of Assistance: Setup, Close supervision  Grooming Where Assessed: Standing sinkside  Grooming Comments: Pt. completed oral care and hand hygiene while standing sinkside with L UE support with close sup.    UE Dressing  UE Dressing Level of Assistance: Setup, Close supervision  UE Dressing Where Assessed: Chair  UE Dressing Comments: Pt. completed UBD to don bra and pullover shirt with set up/close sup while seated in recliner chair.    LE Dressing  LE Dressing: Yes  LE Dressing Adaptive Equipment: Reacher  Pants Level of Assistance: Setup, Contact guard  Sock Level of Assistance: Moderate assistance  Shoe Level of Assistance: Moderate assistance  Adult Briefs Level of Assistance: Minimum assistance, Setup  LE Dressing Where Assessed: Chair  LE Dressing Comments: Pt. completed doffing/donning personal underwear with Min A with use of reacher. Pt. completed donning shorts with set up and verbal cues for sequencing and use of reacher. Pt. required Mod A to doff/don personal socks and to don B shoes.       Activity Tolerance:  Endurance: Tolerates 30+ min exercise without fatigue  Early Mobility/Exercise Safety Screen: Proceed with mobilization - No exclusion criteria met    Bed Mobility/Transfers: Transfers  Transfer: Yes  Transfer 1  Technique 1: Sit to stand, Stand to sit  Transfer Device 1: Walker  Transfer Level of Assistance 1: Close supervision  Trials/Comments 1: Verbal cues for appropriate hand placement (x5 trials)      Functional Mobility:     Sitting Balance:  Static Sitting Balance  Static Sitting-Balance Support: Feet supported  Static Sitting-Level of Assistance: Close supervision  Dynamic Sitting Balance  Dynamic  Sitting-Balance Support: Feet supported  Dynamic Sitting-Balance: Forward lean (slight forward lean)  Standing Balance:  Static Standing Balance  Static Standing-Balance Support: Bilateral upper extremity supported  Static Standing-Level of Assistance: Close supervision  Static Standing-Comment/Number of Minutes: Us eof FWW  Dynamic Standing Balance  Dynamic Standing-Balance Support: Bilateral upper extremity supported  Dynamic Standing-Level of Assistance: Close supervision  Dynamic Standing-Balance: Turning  Dynamic Standing-Comments: Use of FWW    Therapy/Activity: Therapeutic Activity  Therapeutic Activity Performed: Yes  Therapeutic Activity 1: Pt. edu on importance of OOB mobility and edu on spinal precautions with demonstration. Pt. edu on importance of ECT and importance of rest breaks and positioning of every day items to ensure pt. following spinal precautions    Vision:   Sensation:  Light Touch: No apparent deficits  Strength:  Strength Comments: Motivated and strength of completing ADLs/IADLs  Other Activity:     Home Environment:     Perception:  Inattention/Neglect: Appears intact  Coordination:  Finger to Nose: Intact   Hand Function:  Hand Function  Gross Grasp: Functional  Coordination: Functional  Extremities: RUE   RUE : Within Functional Limits and LUE   LUE: Within Functional Limits    Outcome Measures: [facility] Daily Activity  Putting on and taking off regular lower body clothing: A lot  Bathing (including washing, rinsing, drying): A little  Putting on and taking off regular upper body clothing: A little  Toileting, which includes using toilet, bedpan or urinal: A little  Taking care of personal grooming such as brushing teeth: None  Eating Meals: None  Daily Activity - Total Score: 19        Education Documentation  Body Mechanics, taught by Ileana Barclay OT at 6/26/2025 12:06 PM.  Learner: Patient  Readiness: Acceptance  Method: Explanation, Demonstration  Response: Verbalizes  Understanding, Demonstrated Understanding    Precautions, taught by Ileana Barclay OT at 2025 12:06 PM.  Learner: Patient  Readiness: Acceptance  Method: Explanation, Demonstration  Response: Verbalizes Understanding, Demonstrated Understanding    ADL Training, taught by Ileana Barclay OT at 2025 12:06 PM.  Learner: Patient  Readiness: Acceptance  Method: Explanation, Demonstration  Response: Verbalizes Understanding, Demonstrated Understanding    Education Comments  No comments found.        OP EDUCATION:  Education  Individual(s) Educated: Patient, Spouse  Education Provided: Anatomy & Physiology, Diagnosis & Precautions, Fall precautons, Risk and benefits of OT discussed with patient or other, POC discussed and agreed upon  Risk and Benefits Discussed with Patient/Caregiver/Other: yes  Patient/Caregiver Demonstrated Understanding: yes  Plan of Care Discussed and Agreed Upon: yes  Patient Response to Education: Patient/Caregiver Verbalized Understanding of Information, Patient/Caregiver Performed Return Demonstration of Exercises/Activities, Patient/Caregiver Asked Appropriate Questions             [1]   Past Medical History:  Diagnosis Date    Arthritis     CKD (chronic kidney disease)     creat 0.9, BUN 22, GFR 68 - 25    GERD (gastroesophageal reflux disease)     HL (hearing loss)     Hyperparathyroidism (Multi)     following with endo - Ca 10.4- 25 - pt was instructed to stop ca supplement and recheck level in Aug    Hypertension     Hypomagnesemia     started on oral supplement - 1.2 on 25    Prediabetes     24: A1C 6.2%    Sleep apnea     BiPAP    Spinal stenosis, lumbar region with neurogenic claudication    [2]   Past Surgical History:  Procedure Laterality Date    BUNIONECTOMY Left     CARPAL TUNNEL RELEASE Bilateral     CATARACT EXTRACTION W/  INTRAOCULAR LENS IMPLANT Bilateral      SECTION, CLASSIC      x 2    CHOLECYSTECTOMY      COLONOSCOPY      TONSILLECTOMY  N/A     TOTAL KNEE ARTHROPLASTY Right 2021    TOTAL KNEE ARTHROPLASTY Left 2023

## 2025-06-26 NOTE — CARE PLAN
The patient's goals for the shift include      The clinical goals for the shift include Patient will be comfortable throughout shift      Problem: Pain - Adult  Goal: Verbalizes/displays adequate comfort level or baseline comfort level  Outcome: Met     Problem: Safety - Adult  Goal: Free from fall injury  Outcome: Met     Problem: Discharge Planning  Goal: Discharge to home or other facility with appropriate resources  Outcome: Met     Problem: Chronic Conditions and Co-morbidities  Goal: Patient's chronic conditions and co-morbidity symptoms are monitored and maintained or improved  Outcome: Met     Problem: Nutrition  Goal: Nutrient intake appropriate for maintaining nutritional needs  Outcome: Met    Problem: Pain  Goal: Takes deep breaths with improved pain control throughout the shift  Outcome: Met  Goal: Turns in bed with improved pain control throughout the shift  Outcome: Met  Goal: Walks with improved pain control throughout the shift  Outcome: Met  Goal: Performs ADL's with improved pain control throughout shift  Outcome: Met  Goal: Participates in PT with improved pain control throughout the shift  Outcome: Met  Goal: Free from opioid side effects throughout the shift  Outcome: Met  Goal: Free from acute confusion related to pain meds throughout the shift  Outcome: Met

## 2025-06-26 NOTE — CARE PLAN
The patient's goals for the shift include      The clinical goals for the shift include pain control    Over the shift, the patient did make progress toward the following goals.     Problem: Pain  Goal: Takes deep breaths with improved pain control throughout the shift  Outcome: Progressing  Goal: Turns in bed with improved pain control throughout the shift  Outcome: Progressing  Goal: Walks with improved pain control throughout the shift  Outcome: Progressing  Goal: Performs ADL's with improved pain control throughout shift  Outcome: Progressing  Goal: Participates in PT with improved pain control throughout the shift  Outcome: Progressing  Goal: Free from opioid side effects throughout the shift  Outcome: Progressing  Goal: Free from acute confusion related to pain meds throughout the shift  Outcome: Progressing     Problem: Pain - Adult  Goal: Verbalizes/displays adequate comfort level or baseline comfort level  6/26/2025 0102 by Emily Ruboi RN  Outcome: Progressing  6/26/2025 0102 by Emily Rubio RN  Outcome: Progressing

## 2025-06-26 NOTE — DISCHARGE SUMMARY
Discharge Diagnosis  Lumbar radiculopathy    Issues Requiring Follow-Up  Patient should follow up at scheduled 2-3 week post up visit unless:  Uncontrolled bleeding from incision site  Intractable pain in extremities  Signs and symptoms of infection  Inability to urinate/have a bowel movement      Test Results Pending At Discharge  Pending Labs       No current pending labs.            Hospital Course   Patient was taken to the OR on 6/25/2025 for L4-L5 anterior lumbar spine fusion and posterior instrumentation with Dr. Means. Procedure went as planned and without complications and patient was transferred to PACU and in stable condition was transferred to the floor.  She spent 1 night in observation due to a failed physical therapy attempt and recovery.  Patient has been working with PT and OT and improving. On discharge date patient was cleared by PT and orthopedic team and was determined safe for discharge. Daily discussion was had with the patient, nursing staff, orthopedic team, and family members if present. All questions were answered to the patient's satisfaction.      Visit Vitals  /51   Pulse 75   Temp 36.6 °C (97.9 °F) (Temporal)   Resp 16     Vitals:    06/25/25 0805   Weight: 84.2 kg (185 lb 10 oz)       Immunization History   Administered Date(s) Administered    COVID-19, mRNA, LNP-S, PF, 30 mcg/0.3 mL dose 02/09/2021, 03/03/2021, 09/27/2021    Moderna COVID-19 vaccine, 12 years and older (50mcg/0.5mL)(Spikevax) 09/24/2024    Pfizer COVID-19 vaccine, 12 years and older, (30mcg/0.3mL) (Comirnaty) 12/01/2023    Pfizer COVID-19 vaccine, bivalent, age 12 years and older (30 mcg/0.3 mL) 10/28/2022    Pfizer Gray Cap SARS-CoV-2 04/19/2022       Results        Pertinent Physical Exam At Time of Discharge  Physical Exam  Did not examine the patient personally, per Dr. Means's recommendation, patient is doing well and has slight weakness in the right hip flexion which is anticipated from the surgical  approach.  Has been out of bed several times and is ambulating well.  Home Medications     Medication List      START taking these medications     acetaminophen 500 mg tablet; Commonly known as: Tylenol; Take 2 tablets   (1,000 mg) by mouth every 6 hours if needed for mild pain (1 - 3) for up   to 7 days.; Replaces: acetaminophen 650 mg ER tablet   cyclobenzaprine 10 mg tablet; Commonly known as: Flexeril; Take 1 tablet   (10 mg) by mouth 3 times a day as needed for muscle spasms for up to 10   days.   docusate sodium 100 mg capsule; Commonly known as: Colace; Take 1   capsule (100 mg) by mouth 3 times a day as needed for constipation for up   to 7 days.   ketorolac 10 mg tablet; Commonly known as: Toradol; Take 1 tablet (10   mg) by mouth every 6 hours if needed for moderate pain (4 - 6) for up to 5   days.   oxyCODONE 5 mg immediate release tablet; Commonly known as: Roxicodone;   Take 1 tablet (5 mg) by mouth every 6 hours if needed for severe pain (7 -   10) for up to 7 days.     CONTINUE taking these medications     aspirin 81 mg EC tablet   cholecalciferol 25 mcg (1,000 units) capsule; Commonly known as: Vitamin   D-3   fluticasone 50 mcg/actuation nasal spray; Commonly known as: Flonase   gabapentin 300 mg capsule; Commonly known as: Neurontin   lisinopril 20 mg tablet   magnesium oxide 400 mg tablet; Commonly known as: Mag-Ox   metFORMIN  mg 24 hr tablet; Commonly known as: Glucophage-XR   montelukast 10 mg tablet; Commonly known as: Singulair   omeprazole 40 mg DR capsule; Commonly known as: PriLOSEC   semaglutide 2.5 mg/mL solution   traMADol 50 mg tablet; Commonly known as: Ultram     STOP taking these medications     acetaminophen 650 mg ER tablet; Commonly known as: Tylenol 8 HOUR;   Replaced by: acetaminophen 500 mg tablet   benzonatate 100 mg capsule; Commonly known as: Tessalon   chlorhexidine 0.12 % solution; Commonly known as: Peridex   ibuprofen 800 mg tablet       Outpatient Follow-Up  No  future appointments.    Fady Bravo PA-C  11:37 AM  06/26/25

## 2025-07-01 ENCOUNTER — TELEPHONE (OUTPATIENT)
Dept: INPATIENT UNIT | Facility: HOSPITAL | Age: 70
End: 2025-07-01
Payer: MEDICARE

## 2025-07-15 ENCOUNTER — HOSPITAL ENCOUNTER (OUTPATIENT)
Dept: CARDIOLOGY | Facility: HOSPITAL | Age: 70
Discharge: HOME | End: 2025-07-15
Payer: MEDICARE

## 2025-07-15 LAB
ATRIAL RATE: 77 BPM
P AXIS: 35 DEGREES
P OFFSET: 194 MS
P ONSET: 141 MS
PR INTERVAL: 168 MS
Q ONSET: 225 MS
QRS COUNT: 13 BEATS
QRS DURATION: 88 MS
QT INTERVAL: 344 MS
QTC CALCULATION(BAZETT): 389 MS
QTC FREDERICIA: 373 MS
R AXIS: -4 DEGREES
T AXIS: 21 DEGREES
T OFFSET: 397 MS
VENTRICULAR RATE: 77 BPM

## 2025-07-15 PROCEDURE — 93005 ELECTROCARDIOGRAM TRACING: CPT

## 2025-07-17 ENCOUNTER — TRANSCRIBE ORDERS (OUTPATIENT)
Dept: ORTHOPEDIC SURGERY | Facility: HOSPITAL | Age: 70
End: 2025-07-17
Payer: MEDICARE

## 2025-07-17 DIAGNOSIS — M54.16 LUMBAR RADICULOPATHY: ICD-10-CM

## 2025-07-18 ENCOUNTER — HOSPITAL ENCOUNTER (OUTPATIENT)
Dept: RADIOLOGY | Facility: CLINIC | Age: 70
Discharge: HOME | End: 2025-07-18
Payer: MEDICARE

## 2025-07-18 ENCOUNTER — APPOINTMENT (OUTPATIENT)
Dept: ORTHOPEDIC SURGERY | Facility: CLINIC | Age: 70
End: 2025-07-18
Payer: MEDICARE

## 2025-07-18 DIAGNOSIS — T81.31XA SUPERFICIAL DEHISCENCE OF OPERATION WOUND, INITIAL ENCOUNTER: ICD-10-CM

## 2025-07-18 DIAGNOSIS — M54.16 LUMBAR RADICULOPATHY: ICD-10-CM

## 2025-07-18 DIAGNOSIS — M48.062 LUMBAR STENOSIS WITH NEUROGENIC CLAUDICATION: Primary | ICD-10-CM

## 2025-07-18 DIAGNOSIS — M43.10 ACQUIRED SPONDYLOLISTHESIS: ICD-10-CM

## 2025-07-18 DIAGNOSIS — Z98.1 STATUS POST LUMBAR SPINAL FUSION: ICD-10-CM

## 2025-07-18 PROCEDURE — 72100 X-RAY EXAM L-S SPINE 2/3 VWS: CPT

## 2025-07-18 PROCEDURE — 1159F MED LIST DOCD IN RCRD: CPT | Performed by: PHYSICIAN ASSISTANT

## 2025-07-18 PROCEDURE — 1111F DSCHRG MED/CURRENT MED MERGE: CPT | Performed by: PHYSICIAN ASSISTANT

## 2025-07-18 PROCEDURE — 99024 POSTOP FOLLOW-UP VISIT: CPT | Performed by: PHYSICIAN ASSISTANT

## 2025-07-18 RX ORDER — CEPHALEXIN 500 MG/1
500 CAPSULE ORAL EVERY 12 HOURS
Qty: 14 CAPSULE | Refills: 0 | Status: SHIPPED | OUTPATIENT
Start: 2025-07-18 | End: 2025-07-25

## 2025-07-31 NOTE — PROGRESS NOTES
HPI:  Gabby Turner is a 69 y.o. female who presents today for initial postop visit after undergoing L4-L5 Anterior Lumbar Spine Fusion and Posterior Instrumentation  on 06/25/25 with Dr. Means.    Overall, patient is doing very well. She tells me her main pain is in the right buttock, well controlled at this time. She has discontinued the pain medication. She is ambulating well without assistive devices.    ROS:  Reviewed on EMR and patient intake sheet.    PMH/SH:  Reviewed on EMR and patient intake sheet.    Exam:  Well appearing, NAD  Pleasant & cooperative  BMI 38.80  Decreased ROM lumbar spine with rotation, flexion/extension  No paraspinal muscle spasms  lower extremity sensation intact to light touch  lower extremity motor 5/5 throughout; no focal motor weakness  antalgic gait & station; assistive devices: none    Lumbar wound healing well without signs of infection. Slight wound dehiscence and drainage. No redness, palpable fluid collection.  Sutures removed without complication.    Radiology:     Xrays lumbar spine done in the office today 07/18/25 personally reviewed, along with the accompanying rad report when available.     FINDINGS:  Posterior fusion L4-L5 with intact hardware. There is multilevel disc  space narrowing with sclerosis and sclerosis worse at L1-L2 and  L2-L3. No fracture or spondylolisthesis seen      IMPRESSION:  L4-L5 posterior fusion without evidence of hardware failure. Moderate  spondylosis in the upper lumbar spine    Assessment and Plan:  1. Lumbar stenosis with neurogenic claudication (Primary)  2. Acquired spondylolisthesis  3. Status post lumbar spinal fusion  - Referral to Physical Therapy; Future    4. Superficial dehiscence of operation wound, initial encounter  - cephalexin (Keflex) 500 mg capsule; Take 1 capsule (500 mg) by mouth every 12 hours for 7 days.  Dispense: 14 capsule; Refill: 0    Both the patient and I are very pleased with her recovery so far. We reviewed  Xrays in detail today.    We reviewed wound care in detail today. Script for Keflex provided today. Okay to leave incision open to air. Okay to shower letting warm, soapy water run down the wound, do not scrub, pat dry. Okay to apply small amount of hydrogen peroxide nightly to scabs if needed.     Okay to lift up to 25 pounds until 8 weeks postop, then may increase lifting as tolerated. Continue to limit excessive bending/lifting/twisting at the waist, however okay to do gentle ROM exercises to help reduce stiffness & increase mobility. No strenuous activity such as running/jumping/heavy lifting/activities that strain low back until 8 weeks postop. Encourage ambulating as tolerated. If needed okay to start outpatient physical therapy at 6-8 weeks postop.    Pain medication refilled today per patient request. OARRS reviewed. Discussed postop pain medication today and recommend transitioning from opioids to plain tylenol. No NSAIDs for lumbar until 8 weeks postop due to increased risk of pseudoarthrosis.     Plan to see patient for followup with repeat Xrays 1 year postop, or sooner if needed. All questions answered. Patient in agreement to plan of care. Of course Gabby Turner can call at anytime with questions or concerns.       Sendy Linn PA-C  Department of Orthopaedic Surgery    [address] [place] [address]    Voicemail: [phone]   Appts: [phone]  Fax: [phone]

## (undated) DEVICE — SUTURE, VICRYL, 1, 27 IN, CT-1, VIOLET

## (undated) DEVICE — SUTURE, MONOCRYL, 3-0, 18 IN, PS2, UNDYED

## (undated) DEVICE — KIT, XLIF NVM5 DISP

## (undated) DEVICE — GUIDEWIRE, SEXTANT, BLUNT

## (undated) DEVICE — TIP,  ELECTRODE COATED INSULATED, EXTENDED, LF

## (undated) DEVICE — BLADE, SURGICAL, POLYMER COATED P10, STERILE, DISP

## (undated) DEVICE — SUTURE, VICRYL, 2-0, 27 IN, CT-1, VIOLET

## (undated) DEVICE — Device

## (undated) DEVICE — TOWEL, SURGICAL, NEURO, O/R, 16 X 26, BLUE, STERILE

## (undated) DEVICE — EXTENDER, SV TAB, 5.5/6.0

## (undated) DEVICE — GLOVE, SURGICAL, PROTEXIS PI , 9.0, PF, LF

## (undated) DEVICE — BLADE, SAGITTAL NARROW THICK 2108-152

## (undated) DEVICE — FLOSEAL, MATRIX, HEMOSTATIC, FULL STERILE PREP, 5ML

## (undated) DEVICE — NEEDLE, SPINAL, QUINCKE, 18 G X 3.5 IN, PINK HUB

## (undated) DEVICE — CEMENT, MIXEVAC III, 10S BOWL, KNEES

## (undated) DEVICE — ELECTRODE, ELECTROSURGICAL, BLADE, INSULATED, ENT/IMA, STERILE

## (undated) DEVICE — DRILL, NEURO, PRECISION, 3MM X 3.8MM, CARBIDE

## (undated) DEVICE — GLOVE, SURGICAL, PROTEXIS PI MICRO, 7.5, PF, LF

## (undated) DEVICE — SUTURE, ETHIBOND XTRA, 1, 30 IN, CTX, LF

## (undated) DEVICE — GOWN, ASTOUND, XL

## (undated) DEVICE — CORD, CAUTERY, BIOPOLAR FORCEP, 12FT

## (undated) DEVICE — ADHESIVE, SKIN, DERMABOND ADVANCED, 15CM, PEN-STYLE

## (undated) DEVICE — SUTURE, PDS II, 0, 27 IN, CT1, VIOLET

## (undated) DEVICE — SOLUTION, IRRIGATION, X RX SODIUM CHL 0.9%, 1000ML BTL

## (undated) DEVICE — SYRINGE, 20 CC, LUER LOCK

## (undated) DEVICE — GLOVE, SURGICAL, PROTEXIS PI BLUE W/NEUTHERA, 9.0, PF, LF

## (undated) DEVICE — SUTURE, VICRYL, 1, 36 IN, CTX, VIOLET

## (undated) DEVICE — WOUND SYSTEM, DEBRIDEMENT & CLEANING, O.R DUOPAK

## (undated) DEVICE — SOLUTION, IRRIGATION, USP, SODIUM CHLORIDE 0.9%, 3000 ML, BAG

## (undated) DEVICE — DRAPE, INCISE, ANTIMICROBIAL, IOBAN 2, STERI DRAPE, 23 X 33 IN, DISPOSABLE, STERILE

## (undated) DEVICE — ACCESS KIT, MAXCESS 4 SURGICAL

## (undated) DEVICE — BLADE, SAW, SAGITTAL, 18.0 X 1.27 X 90MM

## (undated) DEVICE — DRAPE, INSTRUMENT, W/POUCH, STERI DRAPE, 9 5/8 X 18 LONG

## (undated) DEVICE — COVER HANDLE LIGHT, STERIS, BLUE, STERILE

## (undated) DEVICE — GLOVE, SURGICAL, PROTEXIS PI ORTHO, 8.0, PF, LF

## (undated) DEVICE — APPLICATOR, CHLORAPREP, W/ORANGE TINT, 26ML

## (undated) DEVICE — GLOVE, SURGICAL, PROTEXIS PI , 5.5, PF, LF

## (undated) DEVICE — DRESSING, MEPILEX BORDER, POST-OP AG, 4 X 8 IN

## (undated) DEVICE — SUTURE, ETHILON, 2-0, FSLX 30, BLACK

## (undated) DEVICE — SPONGE, HEMOSTATIC, GELATIN, SURGIFOAM, 2 X 6 CM X 7 MM

## (undated) DEVICE — GLOVE, SURGICAL, PROTEXIS PI BLUE W/NEUTHERA, 6.0, PF, LF

## (undated) DEVICE — ELECTRODE, ELECTROSURGICAL, BLADE, 2.75 IN, TEFLON

## (undated) DEVICE — CAUTERY, PENCIL, PUSH BUTTON, SMOKE EVAC, 70MM

## (undated) DEVICE — DRESSING, ISLAND, ADHESIVE, TELFA, 4 X 8 IN

## (undated) DEVICE — TUBING, SUCTION, NON-CONDUCTIVE, W/CONNECT,.25 IN X 12 FT, STERILE, LF

## (undated) DEVICE — SPONGE, LAP, XRAY DECT, 4IN X 18IN, W/MASTER DMT, STERILE

## (undated) DEVICE — COVER, C-ARM W/CLIPS, OEC GE

## (undated) DEVICE — SUTURE, PDS II, 2-0, 27 IN, SH, VIOLET

## (undated) DEVICE — PREP TRAY, VAGINAL

## (undated) DEVICE — SPONGE, NEURO, 1 X 1 IN, STERILE

## (undated) DEVICE — MODULE, NEEDLE EMG M5

## (undated) DEVICE — DRAPE COVER, C ARM, FLOUROSCAN IMAGING SYS

## (undated) DEVICE — SEALER, BIPOLAR, AQUA MANTYS 6.0

## (undated) DEVICE — KIT, PATIENT CARE, JACKSON TABLE W/PRONE-SAFE HEADREST

## (undated) DEVICE — EXCAL 4MM X 13CM SINGLE

## (undated) DEVICE — DIFFUSER, MAESTRO, CORE

## (undated) DEVICE — CARTRIDGE, MAESTRO OIL, CORE

## (undated) DEVICE — STAPLER, SKIN PROXIMATE, 35 WIDE